# Patient Record
Sex: MALE | Race: WHITE | NOT HISPANIC OR LATINO | Employment: OTHER | URBAN - METROPOLITAN AREA
[De-identification: names, ages, dates, MRNs, and addresses within clinical notes are randomized per-mention and may not be internally consistent; named-entity substitution may affect disease eponyms.]

---

## 2017-03-07 ENCOUNTER — TRANSCRIBE ORDERS (OUTPATIENT)
Dept: CARDIOLOGY | Facility: HOSPITAL | Age: 62
End: 2017-03-07

## 2017-03-07 DIAGNOSIS — I20.0 UNSTABLE ANGINA (HCC): Primary | ICD-10-CM

## 2017-03-08 ENCOUNTER — HOSPITAL ENCOUNTER (OUTPATIENT)
Facility: HOSPITAL | Age: 62
Setting detail: HOSPITAL OUTPATIENT SURGERY
Discharge: HOME | End: 2017-03-08
Attending: INTERNAL MEDICINE | Admitting: INTERNAL MEDICINE

## 2017-03-08 VITALS
SYSTOLIC BLOOD PRESSURE: 119 MMHG | HEIGHT: 71 IN | RESPIRATION RATE: 18 BRPM | WEIGHT: 189.6 LBS | BODY MASS INDEX: 26.54 KG/M2 | TEMPERATURE: 98.3 F | DIASTOLIC BLOOD PRESSURE: 83 MMHG | OXYGEN SATURATION: 96 % | HEART RATE: 70 BPM

## 2017-03-08 DIAGNOSIS — I20.0 UNSTABLE ANGINA (HCC): ICD-10-CM

## 2017-03-08 LAB
ANION GAP SERPL CALCULATED.3IONS-SCNC: 2 MMOL/L (ref 3–11)
BUN BLD-MCNC: 15 MG/DL (ref 9–23)
BUN/CREAT SERPL: 16.7 (ref 7–25)
CALCIUM SPEC-SCNC: 9.9 MG/DL (ref 8.7–10.4)
CHLORIDE SERPL-SCNC: 105 MMOL/L (ref 99–109)
CO2 SERPL-SCNC: 33 MMOL/L (ref 20–31)
CREAT BLD-MCNC: 0.9 MG/DL (ref 0.6–1.3)
DEPRECATED RDW RBC AUTO: 42 FL (ref 37–54)
ERYTHROCYTE [DISTWIDTH] IN BLOOD BY AUTOMATED COUNT: 12.4 % (ref 11.3–14.5)
GFR SERPL CREATININE-BSD FRML MDRD: 86 ML/MIN/1.73
GLUCOSE BLD-MCNC: 82 MG/DL (ref 70–100)
HCT VFR BLD AUTO: 43.9 % (ref 38.9–50.9)
HGB BLD-MCNC: 14.9 G/DL (ref 13.1–17.5)
MCH RBC QN AUTO: 31.6 PG (ref 27–31)
MCHC RBC AUTO-ENTMCNC: 33.9 G/DL (ref 32–36)
MCV RBC AUTO: 93 FL (ref 80–99)
PLATELET # BLD AUTO: 192 10*3/MM3 (ref 150–450)
PMV BLD AUTO: 10.4 FL (ref 6–12)
POTASSIUM BLD-SCNC: 4.1 MMOL/L (ref 3.5–5.5)
RBC # BLD AUTO: 4.72 10*6/MM3 (ref 4.2–5.76)
SODIUM BLD-SCNC: 140 MMOL/L (ref 132–146)
WBC NRBC COR # BLD: 6.88 10*3/MM3 (ref 3.5–10.8)

## 2017-03-08 PROCEDURE — 93458 L HRT ARTERY/VENTRICLE ANGIO: CPT | Performed by: INTERNAL MEDICINE

## 2017-03-08 PROCEDURE — 93005 ELECTROCARDIOGRAM TRACING: CPT | Performed by: INTERNAL MEDICINE

## 2017-03-08 PROCEDURE — C1769 GUIDE WIRE: HCPCS | Performed by: INTERNAL MEDICINE

## 2017-03-08 PROCEDURE — 25010000002 BIVALIRUDIN: Performed by: INTERNAL MEDICINE

## 2017-03-08 PROCEDURE — 25010000002 MIDAZOLAM PER 1 MG: Performed by: INTERNAL MEDICINE

## 2017-03-08 PROCEDURE — 25010000002 FENTANYL CITRATE (PF) 100 MCG/2ML SOLUTION: Performed by: INTERNAL MEDICINE

## 2017-03-08 PROCEDURE — C1753 CATH, INTRAVAS ULTRASOUND: HCPCS | Performed by: INTERNAL MEDICINE

## 2017-03-08 PROCEDURE — 0 IOPAMIDOL PER 1 ML: Performed by: INTERNAL MEDICINE

## 2017-03-08 PROCEDURE — C1874 STENT, COATED/COV W/DEL SYS: HCPCS | Performed by: INTERNAL MEDICINE

## 2017-03-08 PROCEDURE — 25010000002 BIVALIRUDIN PER 1 MG: Performed by: INTERNAL MEDICINE

## 2017-03-08 PROCEDURE — 92978 ENDOLUMINL IVUS OCT C 1ST: CPT | Performed by: INTERNAL MEDICINE

## 2017-03-08 PROCEDURE — C1887 CATHETER, GUIDING: HCPCS | Performed by: INTERNAL MEDICINE

## 2017-03-08 PROCEDURE — C9600 PERC DRUG-EL COR STENT SING: HCPCS | Performed by: INTERNAL MEDICINE

## 2017-03-08 PROCEDURE — 85027 COMPLETE CBC AUTOMATED: CPT | Performed by: INTERNAL MEDICINE

## 2017-03-08 PROCEDURE — 80048 BASIC METABOLIC PNL TOTAL CA: CPT | Performed by: INTERNAL MEDICINE

## 2017-03-08 PROCEDURE — C1894 INTRO/SHEATH, NON-LASER: HCPCS | Performed by: INTERNAL MEDICINE

## 2017-03-08 PROCEDURE — 25010000002 HEPARIN (PORCINE) PER 1000 UNITS: Performed by: INTERNAL MEDICINE

## 2017-03-08 PROCEDURE — 36415 COLL VENOUS BLD VENIPUNCTURE: CPT

## 2017-03-08 DEVICE — IMPLANTABLE DEVICE: Type: IMPLANTABLE DEVICE | Status: FUNCTIONAL

## 2017-03-08 RX ORDER — HYDROCODONE BITARTRATE AND ACETAMINOPHEN 5; 325 MG/1; MG/1
1 TABLET ORAL EVERY 4 HOURS PRN
Status: DISCONTINUED | OUTPATIENT
Start: 2017-03-08 | End: 2017-03-08 | Stop reason: HOSPADM

## 2017-03-08 RX ORDER — CLOPIDOGREL BISULFATE 75 MG/1
TABLET ORAL AS NEEDED
Status: DISCONTINUED | OUTPATIENT
Start: 2017-03-08 | End: 2017-03-08 | Stop reason: HOSPADM

## 2017-03-08 RX ORDER — LIDOCAINE HYDROCHLORIDE 10 MG/ML
INJECTION, SOLUTION INFILTRATION; PERINEURAL AS NEEDED
Status: DISCONTINUED | OUTPATIENT
Start: 2017-03-08 | End: 2017-03-08 | Stop reason: HOSPADM

## 2017-03-08 RX ORDER — SODIUM CHLORIDE 9 MG/ML
250 INJECTION, SOLUTION INTRAVENOUS CONTINUOUS
Status: ACTIVE | OUTPATIENT
Start: 2017-03-08 | End: 2017-03-08

## 2017-03-08 RX ORDER — ASPIRIN 81 MG/1
81 TABLET ORAL DAILY
COMMUNITY
End: 2021-04-13 | Stop reason: ALTCHOICE

## 2017-03-08 RX ORDER — ACETAMINOPHEN 325 MG/1
650 TABLET ORAL EVERY 4 HOURS PRN
Status: DISCONTINUED | OUTPATIENT
Start: 2017-03-08 | End: 2017-03-08 | Stop reason: HOSPADM

## 2017-03-08 RX ORDER — MIDAZOLAM HYDROCHLORIDE 1 MG/ML
INJECTION INTRAMUSCULAR; INTRAVENOUS AS NEEDED
Status: DISCONTINUED | OUTPATIENT
Start: 2017-03-08 | End: 2017-03-08 | Stop reason: HOSPADM

## 2017-03-08 RX ORDER — LOSARTAN POTASSIUM 50 MG/1
50 TABLET ORAL DAILY
Status: ON HOLD | COMMUNITY
End: 2022-03-29

## 2017-03-08 RX ORDER — FENTANYL CITRATE 50 UG/ML
INJECTION, SOLUTION INTRAMUSCULAR; INTRAVENOUS AS NEEDED
Status: DISCONTINUED | OUTPATIENT
Start: 2017-03-08 | End: 2017-03-08 | Stop reason: HOSPADM

## 2017-03-08 RX ORDER — ASPIRIN 325 MG
325 TABLET ORAL ONCE
Status: COMPLETED | OUTPATIENT
Start: 2017-03-08 | End: 2017-03-08

## 2017-03-08 RX ORDER — TEMAZEPAM 7.5 MG/1
7.5 CAPSULE ORAL NIGHTLY PRN
Status: DISCONTINUED | OUTPATIENT
Start: 2017-03-08 | End: 2017-03-08 | Stop reason: HOSPADM

## 2017-03-08 RX ORDER — NALOXONE HCL 0.4 MG/ML
0.4 VIAL (ML) INJECTION
Status: DISCONTINUED | OUTPATIENT
Start: 2017-03-08 | End: 2017-03-08 | Stop reason: HOSPADM

## 2017-03-08 RX ORDER — ALPRAZOLAM 0.25 MG/1
0.25 TABLET ORAL 3 TIMES DAILY PRN
Status: DISCONTINUED | OUTPATIENT
Start: 2017-03-08 | End: 2017-03-08 | Stop reason: HOSPADM

## 2017-03-08 RX ORDER — MORPHINE SULFATE 2 MG/ML
1 INJECTION, SOLUTION INTRAMUSCULAR; INTRAVENOUS EVERY 4 HOURS PRN
Status: DISCONTINUED | OUTPATIENT
Start: 2017-03-08 | End: 2017-03-08 | Stop reason: HOSPADM

## 2017-03-08 RX ORDER — OMEPRAZOLE 40 MG/1
40 CAPSULE, DELAYED RELEASE ORAL DAILY
Status: ON HOLD | COMMUNITY
End: 2022-03-29

## 2017-03-08 RX ADMIN — ASPIRIN 325 MG ORAL TABLET 325 MG: 325 PILL ORAL at 11:14

## 2017-03-09 ENCOUNTER — DOCUMENTATION (OUTPATIENT)
Dept: CARDIAC REHAB | Facility: HOSPITAL | Age: 62
End: 2017-03-09

## 2017-03-09 DIAGNOSIS — Z95.5 STENTED CORONARY ARTERY: Primary | ICD-10-CM

## 2017-03-09 NOTE — PROGRESS NOTES
Pt. Referred for Phase II Cardiac Rehab. Staff left detailed message with Patient to return phone call. Staff will then discuss the benefits of exercise, program protocol, and provide education.Staff will then refer Patient to an outlying facility if he wishes to participate in the program. Thank you for this referral.

## 2021-04-13 ENCOUNTER — APPOINTMENT (OUTPATIENT)
Dept: GENERAL RADIOLOGY | Facility: HOSPITAL | Age: 66
End: 2021-04-13

## 2021-04-13 ENCOUNTER — HOSPITAL ENCOUNTER (EMERGENCY)
Facility: HOSPITAL | Age: 66
Discharge: HOME | End: 2021-04-13
Attending: EMERGENCY MEDICINE | Admitting: EMERGENCY MEDICINE

## 2021-04-13 VITALS
TEMPERATURE: 98.5 F | HEIGHT: 71 IN | SYSTOLIC BLOOD PRESSURE: 137 MMHG | BODY MASS INDEX: 28.56 KG/M2 | HEART RATE: 99 BPM | OXYGEN SATURATION: 95 % | DIASTOLIC BLOOD PRESSURE: 78 MMHG | RESPIRATION RATE: 18 BRPM | WEIGHT: 204 LBS

## 2021-04-13 DIAGNOSIS — N28.9 RENAL INSUFFICIENCY: ICD-10-CM

## 2021-04-13 DIAGNOSIS — I48.92 ATRIAL FLUTTER WITH RAPID VENTRICULAR RESPONSE (HCC): Primary | ICD-10-CM

## 2021-04-13 LAB
ALBUMIN SERPL-MCNC: 4.7 G/DL (ref 3.5–5.2)
ALBUMIN/GLOB SERPL: 1.7 G/DL
ALP SERPL-CCNC: 112 U/L (ref 39–117)
ALT SERPL W P-5'-P-CCNC: 32 U/L (ref 1–41)
ANION GAP SERPL CALCULATED.3IONS-SCNC: 11 MMOL/L (ref 5–15)
AST SERPL-CCNC: 34 U/L (ref 1–40)
BASOPHILS # BLD AUTO: 0.07 10*3/MM3 (ref 0–0.2)
BASOPHILS NFR BLD AUTO: 0.8 % (ref 0–1.5)
BILIRUB SERPL-MCNC: 0.7 MG/DL (ref 0–1.2)
BUN SERPL-MCNC: 22 MG/DL (ref 8–23)
BUN/CREAT SERPL: 16.7 (ref 7–25)
CALCIUM SPEC-SCNC: 10.4 MG/DL (ref 8.6–10.5)
CHLORIDE SERPL-SCNC: 106 MMOL/L (ref 98–107)
CO2 SERPL-SCNC: 26 MMOL/L (ref 22–29)
CREAT SERPL-MCNC: 1.32 MG/DL (ref 0.76–1.27)
DEPRECATED RDW RBC AUTO: 44.1 FL (ref 37–54)
EOSINOPHIL # BLD AUTO: 0.09 10*3/MM3 (ref 0–0.4)
EOSINOPHIL NFR BLD AUTO: 1 % (ref 0.3–6.2)
ERYTHROCYTE [DISTWIDTH] IN BLOOD BY AUTOMATED COUNT: 12.8 % (ref 12.3–15.4)
GFR SERPL CREATININE-BSD FRML MDRD: 55 ML/MIN/1.73
GLOBULIN UR ELPH-MCNC: 2.8 GM/DL
GLUCOSE SERPL-MCNC: 102 MG/DL (ref 65–99)
HCT VFR BLD AUTO: 48.3 % (ref 37.5–51)
HGB BLD-MCNC: 16.3 G/DL (ref 13–17.7)
HOLD SPECIMEN: NORMAL
IMM GRANULOCYTES # BLD AUTO: 0.03 10*3/MM3 (ref 0–0.05)
IMM GRANULOCYTES NFR BLD AUTO: 0.3 % (ref 0–0.5)
LIPASE SERPL-CCNC: 29 U/L (ref 13–60)
LYMPHOCYTES # BLD AUTO: 2.38 10*3/MM3 (ref 0.7–3.1)
LYMPHOCYTES NFR BLD AUTO: 25.9 % (ref 19.6–45.3)
MCH RBC QN AUTO: 32.1 PG (ref 26.6–33)
MCHC RBC AUTO-ENTMCNC: 33.7 G/DL (ref 31.5–35.7)
MCV RBC AUTO: 95.3 FL (ref 79–97)
MONOCYTES # BLD AUTO: 1.02 10*3/MM3 (ref 0.1–0.9)
MONOCYTES NFR BLD AUTO: 11.1 % (ref 5–12)
NEUTROPHILS NFR BLD AUTO: 5.59 10*3/MM3 (ref 1.7–7)
NEUTROPHILS NFR BLD AUTO: 60.9 % (ref 42.7–76)
NRBC BLD AUTO-RTO: 0 /100 WBC (ref 0–0.2)
NT-PROBNP SERPL-MCNC: 46.6 PG/ML (ref 0–900)
PLATELET # BLD AUTO: 241 10*3/MM3 (ref 140–450)
PMV BLD AUTO: 10.3 FL (ref 6–12)
POTASSIUM SERPL-SCNC: 4.2 MMOL/L (ref 3.5–5.2)
PROT SERPL-MCNC: 7.5 G/DL (ref 6–8.5)
RBC # BLD AUTO: 5.07 10*6/MM3 (ref 4.14–5.8)
SODIUM SERPL-SCNC: 143 MMOL/L (ref 136–145)
TROPONIN T SERPL-MCNC: <0.01 NG/ML (ref 0–0.03)
WBC # BLD AUTO: 9.18 10*3/MM3 (ref 3.4–10.8)
WHOLE BLOOD HOLD SPECIMEN: NORMAL
WHOLE BLOOD HOLD SPECIMEN: NORMAL

## 2021-04-13 PROCEDURE — 99152 MOD SED SAME PHYS/QHP 5/>YRS: CPT

## 2021-04-13 PROCEDURE — 71045 X-RAY EXAM CHEST 1 VIEW: CPT

## 2021-04-13 PROCEDURE — 83880 ASSAY OF NATRIURETIC PEPTIDE: CPT | Performed by: EMERGENCY MEDICINE

## 2021-04-13 PROCEDURE — 83690 ASSAY OF LIPASE: CPT | Performed by: EMERGENCY MEDICINE

## 2021-04-13 PROCEDURE — 99284 EMERGENCY DEPT VISIT MOD MDM: CPT

## 2021-04-13 PROCEDURE — 85025 COMPLETE CBC W/AUTO DIFF WBC: CPT | Performed by: EMERGENCY MEDICINE

## 2021-04-13 PROCEDURE — 25010000002 PROPOFOL 10 MG/ML EMULSION: Performed by: EMERGENCY MEDICINE

## 2021-04-13 PROCEDURE — 25010000002 ADENOSINE PER 6 MG: Performed by: EMERGENCY MEDICINE

## 2021-04-13 PROCEDURE — 80053 COMPREHEN METABOLIC PANEL: CPT | Performed by: EMERGENCY MEDICINE

## 2021-04-13 PROCEDURE — 96374 THER/PROPH/DIAG INJ IV PUSH: CPT

## 2021-04-13 PROCEDURE — 93005 ELECTROCARDIOGRAM TRACING: CPT | Performed by: EMERGENCY MEDICINE

## 2021-04-13 PROCEDURE — 84484 ASSAY OF TROPONIN QUANT: CPT | Performed by: EMERGENCY MEDICINE

## 2021-04-13 PROCEDURE — 92960 CARDIOVERSION ELECTRIC EXT: CPT

## 2021-04-13 RX ORDER — PROPOFOL 10 MG/ML
100 VIAL (ML) INTRAVENOUS ONCE
Status: COMPLETED | OUTPATIENT
Start: 2021-04-13 | End: 2021-04-13

## 2021-04-13 RX ORDER — ASPIRIN 81 MG/1
324 TABLET, CHEWABLE ORAL ONCE
Status: DISCONTINUED | OUTPATIENT
Start: 2021-04-13 | End: 2021-04-13 | Stop reason: HOSPADM

## 2021-04-13 RX ORDER — ADENOSINE 3 MG/ML
12 INJECTION, SOLUTION INTRAVENOUS ONCE
Status: COMPLETED | OUTPATIENT
Start: 2021-04-13 | End: 2021-04-13

## 2021-04-13 RX ORDER — SODIUM CHLORIDE 0.9 % (FLUSH) 0.9 %
10 SYRINGE (ML) INJECTION AS NEEDED
Status: DISCONTINUED | OUTPATIENT
Start: 2021-04-13 | End: 2021-04-13 | Stop reason: HOSPADM

## 2021-04-13 RX ADMIN — ADENOSINE 12 MG: 3 INJECTION INTRAVENOUS at 00:37

## 2021-04-13 RX ADMIN — RIVAROXABAN 20 MG: 20 TABLET, FILM COATED ORAL at 02:28

## 2021-04-13 RX ADMIN — SODIUM CHLORIDE 1000 ML: 9 INJECTION, SOLUTION INTRAVENOUS at 02:15

## 2021-04-13 RX ADMIN — PROPOFOL 100 MG: 10 INJECTION, EMULSION INTRAVENOUS at 01:43

## 2021-04-13 NOTE — ED NOTES
Timeout:  Physician x 1 RN x 1  0140: 60mg propofol IV given by provider  0143: 20mg propofol IV given by provider  0144: Synchronized cardioversion performed at 200j  0145: 20mg propofol IV given by provider  HR 98     Iqra Hansen, RN  04/13/21 0145

## 2021-04-13 NOTE — ED PROVIDER NOTES
"Subjective   65-year-old male presents for evaluation of heart palpitations.  Of note, the patient has a history of coronary artery disease.  He is followed by Dr. Cesar.  He states that he had been doing well until tonight just after 9 PM.  He just finished doing some yard work when he began experiencing a rapid heart rate and palpitations as well as some mild chest pressure.  He checked his vital signs and noted that his heart rate was in the 160s.  This was quite abnormal for him so he was brought to the emergency department by his wife who is a retired nurse.  He denies any active chest pain at this time but states that he can feel his heart \"racing.\"  He is not a heavy alcohol drinker.  He denies any recent changes to his diet or caffeine intake.  He did start taking any vitamin recently.  He is unsure what could have potentially triggered his symptoms tonight.          Review of Systems   Respiratory: Positive for chest tightness.    Cardiovascular: Positive for palpitations.   All other systems reviewed and are negative.      Past Medical History:   Diagnosis Date   • Chest pain    • JOHNSON (dyspnea on exertion)    • GERD (gastroesophageal reflux disease)    • Hypertension    • Kidney stones    • SOB (shortness of breath)    • TB (tuberculosis), treated        Allergies   Allergen Reactions   • Sulfa Antibiotics Rash       Past Surgical History:   Procedure Laterality Date   • CARDIAC CATHETERIZATION N/A 3/8/2017    Procedure: Left Heart Cath;  Surgeon: Aidan Cesar MD;  Location: [facility] INVASIVE LOCATION;  Service:    • CATARACT EXTRACTION     • RETINAL DETACHMENT REPAIR Left    • TONSILLECTOMY         No family history on file.    Social History     Socioeconomic History   • Marital status:      Spouse name: Not on file   • Number of children: Not on file   • Years of education: Not on file   • Highest education level: Not on file   Tobacco Use   • Smoking status: Former Smoker "   Substance and Sexual Activity   • Alcohol use: No   • Drug use: No   • Sexual activity: Defer           Objective   Physical Exam  Vitals and nursing note reviewed.   Constitutional:       General: He is not in acute distress.     Appearance: He is well-developed. He is not diaphoretic.      Comments: Nontoxic-appearing male   HENT:      Head: Normocephalic and atraumatic.   Neck:      Vascular: No JVD.   Cardiovascular:      Rate and Rhythm: Regular rhythm.      Heart sounds: Normal heart sounds. No murmur heard.   No friction rub. No gallop.       Comments: Tachycardic  Pulmonary:      Effort: Pulmonary effort is normal. No respiratory distress.      Breath sounds: Normal breath sounds. No wheezing or rales.   Abdominal:      General: Bowel sounds are normal. There is no distension.      Palpations: Abdomen is soft. There is no mass.      Tenderness: There is no abdominal tenderness. There is no guarding.   Musculoskeletal:         General: Normal range of motion.      Cervical back: Normal range of motion.      Right lower leg: No edema.      Left lower leg: No edema.   Skin:     General: Skin is warm and dry.      Coloration: Skin is not pale.      Findings: No erythema or rash.   Neurological:      General: No focal deficit present.      Mental Status: He is alert and oriented to person, place, and time.   Psychiatric:         Mood and Affect: Mood normal.         Thought Content: Thought content normal.         Judgment: Judgment normal.         Chemical Cardioversion    Date/Time: 4/13/2021 3:44 AM  Performed by: Mynor Alvarado MD  Authorized by: Mynor Alvarado MD     Consent:     Consent obtained:  Verbal and written    Consent given by:  Patient    Risks discussed:  Death and induced arrhythmia  Pre-procedure details:     Pre-procedure rhythm: Narrow complex regular tachycardia--SVT versus atrial flutter with rapid ventricular response.  Attempt one:     Cardioversion medication:  Adenosine  12mg      Medication outcome:  No change in rhythm  Post-procedure details:     Patient status:  Awake    Patient tolerance of procedure:  Tolerated well, no immediate complications  Critical Care  Performed by: Mynor Alvarado MD  Authorized by: Mynor Alvarado MD     Critical care provider statement:     Critical care time (minutes):  40    Critical care was necessary to treat or prevent imminent or life-threatening deterioration of the following conditions:  Cardiac failure    Critical care was time spent personally by me on the following activities:  Development of treatment plan with patient or surrogate, evaluation of patient's response to treatment, examination of patient, obtaining history from patient or surrogate, ordering and performing treatments and interventions, ordering and review of laboratory studies, ordering and review of radiographic studies, pulse oximetry, re-evaluation of patient's condition and review of old charts  Electrical Cardioversion    Date/Time: 4/13/2021 3:45 AM  Performed by: Mynor Alvarado MD  Authorized by: Mynor Alvarado MD     Consent:     Consent obtained:  Verbal and written    Consent given by:  Patient    Risks discussed:  Cutaneous burn, death, induced arrhythmia and pain    Alternatives discussed:  Rate-control medication  Pre-procedure details:     Rhythm:  Atrial flutter    Electrode placement:  Anterior-posterior  Attempt one:     Cardioversion mode:  Synchronous    Waveform:  Biphasic    Shock (Joules):  200    Shock outcome:  Conversion to normal sinus rhythm  Post-procedure details:     Patient status:  Awake    Patient tolerance of procedure:  Tolerated well, no immediate complications  Procedural Sedation    Date/Time: 4/13/2021 3:46 AM  Performed by: Mynor Alvarado MD  Authorized by: Mynor Alvarado MD     Consent:     Consent obtained:  Verbal and written    Consent given by:  Patient    Risks discussed:  Allergic reaction,  inadequate sedation, dysrhythmia, nausea, prolonged hypoxia resulting in organ damage, respiratory compromise necessitating ventilatory assistance and intubation and vomiting  Indications:     Procedure performed:  Cardioversion    Procedure necessitating sedation performed by:  Physician performing sedation    Intended level of sedation:  Moderate (conscious sedation)  Pre-sedation assessment:     Time since last food or drink:  6 PM    ASA classification: class 2 - patient with mild systemic disease      Mallampati score:  II - soft palate, uvula, fauces visible    Pre-sedation assessments completed and reviewed: airway patency, anesthesia/sedation history, cardiovascular function, hydration status, mental status, nausea/vomiting, pain level, respiratory function and temperature      Pre-sedation assessment completed:  4/13/2021 1:30 AM  Immediate pre-procedure details:     Reassessment: Patient reassessed immediately prior to procedure      Reviewed: vital signs, relevant labs/tests and NPO status      Verified: bag valve mask available, emergency equipment available, intubation equipment available, IV patency confirmed and oxygen available    Procedure details (see MAR for exact dosages):     Sedation start time:  4/13/2021 1:40 AM    Preoxygenation:  Nasal cannula    Sedation:  Propofol    Intra-procedure monitoring:  Blood pressure monitoring, cardiac monitor, continuous pulse oximetry, continuous capnometry and frequent LOC assessments    Intra-procedure events: none      Sedation end time:  4/13/2021 1:52 AM    Total sedation time (minutes):  12  Post-procedure details:     Post-sedation assessment completed:  4/13/2021 2:09 AM    Attendance: Constant attendance by certified staff until patient recovered      Recovery: Patient returned to pre-procedure baseline      Estimated blood loss (see I/O flowsheets): no      Complications:  N/A    Post-sedation assessments completed and reviewed: airway patency,  cardiovascular function, hydration status, mental status, nausea/vomiting, pain level and respiratory function      Specimens recovered:  None    Patient is stable for discharge or admission: yes      Patient tolerance:  Tolerated well, no immediate complications               ED Course  ED Course as of Apr 13 0349 Tue Apr 13, 2021 0347 65-year-old male with a history of coronary artery disease presents for evaluation of palpitations this evening.  His symptoms started just after 9 PM.  On arrival to the ED, the patient is nontoxic-appearing.  However, he is quite tachycardic with heart rates ranging from 160-170.  His initial EKG revealed a regular narrow complex tachycardia concerning for SVT versus atrial flutter with rapid ventricular response.  I attempted vagal maneuvers with no change in the patient's heart rate.  Adenosine 12 mg IV was given which unmasked underlying atrial flutter waves.  The patient is a very good historian with a clear time of onset of symptoms.  He is an ideal candidate for electrical cardioversion.  Labs remarkable only for mild renal insufficiency.  IV fluids given.  After obtaining informed consent, using propofol for procedural sedation, the patient was electrically cardioverted at 200 J on the first attempt successfully.  Repeat EKG revealed normal sinus rhythm.  The patient was then observed in the emergency department for nearly an hour and remained in normal sinus rhythm.  I feel that he is appropriate for discharge and prompt outpatient cardiology follow-up at this point.  [id number] score of 2.  Prescription for Xarelto.  First dose given here in the emergency department.  The patient will contact Dr. Cesar first thing tomorrow morning.  Additionally, he was given a referral to our heart valve arrhythmia clinic and will follow-up within the next 72 hours.  Agreeable with plan and given appropriate strict return precautions.  Patient feels well at the time of discharge.     [DD]      ED Course User Index  [DD] Mynor Alvarado MD                                   Recent Results (from the past 24 hour(s))   Troponin    Collection Time: 04/13/21 12:22 AM    Specimen: Blood   Result Value Ref Range    Troponin T <0.010 0.000 - 0.030 ng/mL   Comprehensive Metabolic Panel    Collection Time: 04/13/21 12:22 AM    Specimen: Blood   Result Value Ref Range    Glucose 102 (H) 65 - 99 mg/dL    BUN 22 8 - 23 mg/dL    Creatinine 1.32 (H) 0.76 - 1.27 mg/dL    Sodium 143 136 - 145 mmol/L    Potassium 4.2 3.5 - 5.2 mmol/L    Chloride 106 98 - 107 mmol/L    CO2 26.0 22.0 - 29.0 mmol/L    Calcium 10.4 8.6 - 10.5 mg/dL    Total Protein 7.5 6.0 - 8.5 g/dL    Albumin 4.70 3.50 - 5.20 g/dL    ALT (SGPT) 32 1 - 41 U/L    AST (SGOT) 34 1 - 40 U/L    Alkaline Phosphatase 112 39 - 117 U/L    Total Bilirubin 0.7 0.0 - 1.2 mg/dL    eGFR Non African Amer 55 (L) >60 mL/min/1.73    Globulin 2.8 gm/dL    A/G Ratio 1.7 g/dL    BUN/Creatinine Ratio 16.7 7.0 - 25.0    Anion Gap 11.0 5.0 - 15.0 mmol/L   Lipase    Collection Time: 04/13/21 12:22 AM    Specimen: Blood   Result Value Ref Range    Lipase 29 13 - 60 U/L   BNP    Collection Time: 04/13/21 12:22 AM    Specimen: Blood   Result Value Ref Range    proBNP 46.6 0.0 - 900.0 pg/mL   Light Blue Top    Collection Time: 04/13/21 12:22 AM   Result Value Ref Range    Extra Tube hold for add-on    Green Top (Gel)    Collection Time: 04/13/21 12:22 AM   Result Value Ref Range    Extra Tube Hold for add-ons.    Lavender Top    Collection Time: 04/13/21 12:22 AM   Result Value Ref Range    Extra Tube hold for add-on    Gold Top - SST    Collection Time: 04/13/21 12:22 AM   Result Value Ref Range    Extra Tube Hold for add-ons.    Gray Top - Ice    Collection Time: 04/13/21 12:22 AM   Result Value Ref Range    Extra Tube Hold for add-ons.    CBC Auto Differential    Collection Time: 04/13/21 12:22 AM    Specimen: Blood   Result Value Ref Range    WBC 9.18 3.40 - 10.80  10*3/mm3    RBC 5.07 4.14 - 5.80 10*6/mm3    Hemoglobin 16.3 13.0 - 17.7 g/dL    Hematocrit 48.3 37.5 - 51.0 %    MCV 95.3 79.0 - 97.0 fL    MCH 32.1 26.6 - 33.0 pg    MCHC 33.7 31.5 - 35.7 g/dL    RDW 12.8 12.3 - 15.4 %    RDW-SD 44.1 37.0 - 54.0 fl    MPV 10.3 6.0 - 12.0 fL    Platelets 241 140 - 450 10*3/mm3    Neutrophil % 60.9 42.7 - 76.0 %    Lymphocyte % 25.9 19.6 - 45.3 %    Monocyte % 11.1 5.0 - 12.0 %    Eosinophil % 1.0 0.3 - 6.2 %    Basophil % 0.8 0.0 - 1.5 %    Immature Grans % 0.3 0.0 - 0.5 %    Neutrophils, Absolute 5.59 1.70 - 7.00 10*3/mm3    Lymphocytes, Absolute 2.38 0.70 - 3.10 10*3/mm3    Monocytes, Absolute 1.02 (H) 0.10 - 0.90 10*3/mm3    Eosinophils, Absolute 0.09 0.00 - 0.40 10*3/mm3    Basophils, Absolute 0.07 0.00 - 0.20 10*3/mm3    Immature Grans, Absolute 0.03 0.00 - 0.05 10*3/mm3    nRBC 0.0 0.0 - 0.2 /100 WBC     Note: In addition to lab results from this visit, the labs listed above may include labs taken at another facility or during a different encounter within the last 24 hours. Please correlate lab times with ED admission and discharge times for further clarification of the services performed during this visit.    XR Chest 1 View   Final Result   No active disease.      Signer Name: Gideon Joseph MD    Signed: 4/13/2021 12:44 AM    Workstation Name: [email or web address]     [facility]        Vitals:    04/13/21 0145 04/13/21 0200 04/13/21 0230 04/13/21 0232   BP: (!) 163/108 124/84 137/78    BP Location:       Patient Position:       Pulse: 95 96  99   Resp:       Temp:       SpO2: 98% 98% 95% 95%   Weight:       Height:         Medications   sodium chloride 0.9 % flush 10 mL (has no administration in time range)   aspirin chewable tablet 324 mg (0 mg Oral Hold 4/13/21 0023)   adenosine (ADENOCARD) injection 12 mg (12 mg Intravenous Given 4/13/21 0037)   Propofol (DIPRIVAN) injection 100 mg (100 mg Intravenous Given 4/13/21 0143)   sodium chloride 0.9 %  bolus 1,000 mL (0 mL Intravenous Stopped 4/13/21 0248)   rivaroxaban (XARELTO) tablet 20 mg (20 mg Oral Given 4/13/21 0228)     ECG/EMG Results (last 24 hours)     Procedure Component Value Units Date/Time    ECG 12 Lead [[id number]] Collected: 04/13/21 0010     Updated: 04/13/21 0012    ECG 12 Lead [[id number]] Collected: 04/13/21 0149     Updated: 04/13/21 0149        ECG 12 Lead         ECG 12 Lead                     MDM    Final diagnoses:   Atrial flutter with rapid ventricular response (CMS/HCC)   Renal insufficiency       ED Disposition  ED Disposition     ED Disposition Condition Comment    Discharge Stable           Jose G Silva, DO  [address]  [facility] [id number]  [phone]    In 1 week      [facility] CARDIOLOGY  [address]  [address]  [facility] [address] [phone]  In 3 days      [facility] CARDIOLOG[facility]  [address]  [address]  [facility] [address] [phone]             Medication List      New Prescriptions    rivaroxaban 20 MG tablet  Commonly known as: XARELTO  Take 1 tablet by mouth Daily.           Where to Get Your Medications      These medications were sent to [facility] [address] [place] [address] - [address] - [phone]  - [phone] FX  [address] [facility] [address]    Phone: [phone]   · rivaroxaban 20 MG tablet          Mynor Alvarado MD  04/13/21 9805

## 2021-04-14 LAB
QT INTERVAL: 270 MS
QT INTERVAL: 334 MS
QTC INTERVAL: 421 MS
QTC INTERVAL: 447 MS

## 2022-03-14 ENCOUNTER — TRANSCRIBE ORDERS (OUTPATIENT)
Dept: ADMINISTRATIVE | Facility: HOSPITAL | Age: 67
End: 2022-03-14

## 2022-03-14 DIAGNOSIS — I20.0 UNSTABLE ANGINA: Primary | ICD-10-CM

## 2022-03-29 ENCOUNTER — HOSPITAL ENCOUNTER (OUTPATIENT)
Facility: HOSPITAL | Age: 67
Setting detail: HOSPITAL OUTPATIENT SURGERY
Discharge: HOME | End: 2022-03-29
Attending: INTERNAL MEDICINE | Admitting: INTERNAL MEDICINE

## 2022-03-29 VITALS
SYSTOLIC BLOOD PRESSURE: 122 MMHG | HEART RATE: 57 BPM | WEIGHT: 206.79 LBS | BODY MASS INDEX: 28.95 KG/M2 | OXYGEN SATURATION: 95 % | DIASTOLIC BLOOD PRESSURE: 75 MMHG | TEMPERATURE: 98.7 F | HEIGHT: 71 IN | RESPIRATION RATE: 16 BRPM

## 2022-03-29 DIAGNOSIS — I20.0 UNSTABLE ANGINA: ICD-10-CM

## 2022-03-29 LAB
ANION GAP SERPL CALCULATED.3IONS-SCNC: 12 MMOL/L (ref 5–15)
BUN SERPL-MCNC: 19 MG/DL (ref 8–23)
BUN/CREAT SERPL: 19 (ref 7–25)
CALCIUM SPEC-SCNC: 9.5 MG/DL (ref 8.6–10.5)
CHLORIDE SERPL-SCNC: 105 MMOL/L (ref 98–107)
CO2 SERPL-SCNC: 21 MMOL/L (ref 22–29)
CREAT BLDA-MCNC: 1 MG/DL (ref 0.6–1.3)
CREAT SERPL-MCNC: 1 MG/DL (ref 0.76–1.27)
DEPRECATED RDW RBC AUTO: 42.9 FL (ref 37–54)
EGFRCR SERPLBLD CKD-EPI 2021: 83.5 ML/MIN/1.73
ERYTHROCYTE [DISTWIDTH] IN BLOOD BY AUTOMATED COUNT: 12.4 % (ref 12.3–15.4)
GLUCOSE SERPL-MCNC: 114 MG/DL (ref 65–99)
HCT VFR BLD AUTO: 43.9 % (ref 37.5–51)
HGB BLD-MCNC: 15.1 G/DL (ref 13–17.7)
MCH RBC QN AUTO: 32.3 PG (ref 26.6–33)
MCHC RBC AUTO-ENTMCNC: 34.4 G/DL (ref 31.5–35.7)
MCV RBC AUTO: 94 FL (ref 79–97)
PLATELET # BLD AUTO: 183 10*3/MM3 (ref 140–450)
PMV BLD AUTO: 10.5 FL (ref 6–12)
POTASSIUM SERPL-SCNC: 4.5 MMOL/L (ref 3.5–5.2)
RBC # BLD AUTO: 4.67 10*6/MM3 (ref 4.14–5.8)
SODIUM SERPL-SCNC: 138 MMOL/L (ref 136–145)
WBC NRBC COR # BLD: 5.34 10*3/MM3 (ref 3.4–10.8)

## 2022-03-29 PROCEDURE — C1760 CLOSURE DEV, VASC: HCPCS | Performed by: INTERNAL MEDICINE

## 2022-03-29 PROCEDURE — C1769 GUIDE WIRE: HCPCS | Performed by: INTERNAL MEDICINE

## 2022-03-29 PROCEDURE — 93458 L HRT ARTERY/VENTRICLE ANGIO: CPT | Performed by: INTERNAL MEDICINE

## 2022-03-29 PROCEDURE — 25010000002 MIDAZOLAM PER 1 MG: Performed by: INTERNAL MEDICINE

## 2022-03-29 PROCEDURE — 85027 COMPLETE CBC AUTOMATED: CPT | Performed by: INTERNAL MEDICINE

## 2022-03-29 PROCEDURE — 0 IOPAMIDOL PER 1 ML: Performed by: INTERNAL MEDICINE

## 2022-03-29 PROCEDURE — C1894 INTRO/SHEATH, NON-LASER: HCPCS | Performed by: INTERNAL MEDICINE

## 2022-03-29 PROCEDURE — 82565 ASSAY OF CREATININE: CPT

## 2022-03-29 PROCEDURE — 80048 BASIC METABOLIC PNL TOTAL CA: CPT | Performed by: INTERNAL MEDICINE

## 2022-03-29 PROCEDURE — 25010000002 FENTANYL CITRATE (PF) 50 MCG/ML SOLUTION: Performed by: INTERNAL MEDICINE

## 2022-03-29 RX ORDER — ATORVASTATIN CALCIUM 40 MG/1
40 TABLET, FILM COATED ORAL NIGHTLY
COMMUNITY

## 2022-03-29 RX ORDER — SODIUM CHLORIDE 9 MG/ML
250 INJECTION, SOLUTION INTRAVENOUS CONTINUOUS
Status: ACTIVE | OUTPATIENT
Start: 2022-03-29 | End: 2022-03-29

## 2022-03-29 RX ORDER — HYDROCODONE BITARTRATE AND ACETAMINOPHEN 5; 325 MG/1; MG/1
1 TABLET ORAL EVERY 4 HOURS PRN
Status: DISCONTINUED | OUTPATIENT
Start: 2022-03-29 | End: 2022-03-29 | Stop reason: HOSPADM

## 2022-03-29 RX ORDER — TAMSULOSIN HYDROCHLORIDE 0.4 MG/1
1 CAPSULE ORAL DAILY
COMMUNITY
End: 2023-02-22

## 2022-03-29 RX ORDER — PANTOPRAZOLE SODIUM 40 MG/1
40 TABLET, DELAYED RELEASE ORAL DAILY
COMMUNITY

## 2022-03-29 RX ORDER — FENTANYL CITRATE 50 UG/ML
INJECTION, SOLUTION INTRAMUSCULAR; INTRAVENOUS AS NEEDED
Status: DISCONTINUED | OUTPATIENT
Start: 2022-03-29 | End: 2022-03-29 | Stop reason: HOSPADM

## 2022-03-29 RX ORDER — METOPROLOL SUCCINATE 50 MG/1
50 TABLET, EXTENDED RELEASE ORAL DAILY
Status: ON HOLD | COMMUNITY
End: 2022-08-09

## 2022-03-29 RX ORDER — MIDAZOLAM HYDROCHLORIDE 1 MG/ML
INJECTION INTRAMUSCULAR; INTRAVENOUS AS NEEDED
Status: DISCONTINUED | OUTPATIENT
Start: 2022-03-29 | End: 2022-03-29 | Stop reason: HOSPADM

## 2022-03-29 RX ORDER — ALPRAZOLAM 0.25 MG/1
0.25 TABLET ORAL 3 TIMES DAILY PRN
Status: DISCONTINUED | OUTPATIENT
Start: 2022-03-29 | End: 2022-03-29 | Stop reason: HOSPADM

## 2022-03-29 RX ORDER — LIDOCAINE HYDROCHLORIDE 10 MG/ML
INJECTION, SOLUTION EPIDURAL; INFILTRATION; INTRACAUDAL; PERINEURAL AS NEEDED
Status: DISCONTINUED | OUTPATIENT
Start: 2022-03-29 | End: 2022-03-29 | Stop reason: HOSPADM

## 2022-03-29 RX ORDER — FINASTERIDE 5 MG/1
5 TABLET, FILM COATED ORAL DAILY
COMMUNITY

## 2022-03-29 RX ORDER — ASCORBIC ACID 500 MG
500 TABLET ORAL DAILY
COMMUNITY
End: 2022-06-13

## 2022-03-29 RX ORDER — CHOLECALCIFEROL (VITAMIN D3) 125 MCG
500 CAPSULE ORAL DAILY
COMMUNITY

## 2022-03-29 RX ORDER — TEMAZEPAM 7.5 MG/1
7.5 CAPSULE ORAL NIGHTLY PRN
Status: DISCONTINUED | OUTPATIENT
Start: 2022-03-29 | End: 2022-03-29 | Stop reason: HOSPADM

## 2022-03-29 RX ORDER — ASPIRIN 81 MG/1
81 TABLET ORAL DAILY
COMMUNITY

## 2022-03-29 RX ORDER — MULTIVIT-MIN/IRON/FOLIC ACID/K 18-600-40
CAPSULE ORAL DAILY
COMMUNITY
End: 2022-06-13

## 2022-03-29 RX ORDER — MULTIPLE VITAMINS W/ MINERALS TAB 9MG-400MCG
1 TAB ORAL DAILY
COMMUNITY

## 2022-03-29 RX ORDER — TRAZODONE HYDROCHLORIDE 50 MG/1
25 TABLET ORAL NIGHTLY PRN
COMMUNITY
End: 2022-06-13

## 2022-03-29 RX ORDER — OXYCODONE AND ACETAMINOPHEN 10; 325 MG/1; MG/1
1 TABLET ORAL EVERY 4 HOURS PRN
Status: DISCONTINUED | OUTPATIENT
Start: 2022-03-29 | End: 2022-03-29 | Stop reason: HOSPADM

## 2022-03-29 RX ORDER — LOSARTAN POTASSIUM 100 MG/1
100 TABLET ORAL DAILY
COMMUNITY
End: 2023-02-10

## 2022-03-29 RX ORDER — ACETAMINOPHEN 325 MG/1
650 TABLET ORAL EVERY 4 HOURS PRN
Status: DISCONTINUED | OUTPATIENT
Start: 2022-03-29 | End: 2022-03-29 | Stop reason: HOSPADM

## 2022-03-29 RX ORDER — CHLORAL HYDRATE 500 MG
2000 CAPSULE ORAL
COMMUNITY

## 2022-06-13 ENCOUNTER — OFFICE VISIT (OUTPATIENT)
Dept: CARDIOLOGY | Facility: CLINIC | Age: 67
End: 2022-06-13

## 2022-06-13 VITALS
WEIGHT: 211 LBS | SYSTOLIC BLOOD PRESSURE: 124 MMHG | BODY MASS INDEX: 29.54 KG/M2 | HEIGHT: 71 IN | OXYGEN SATURATION: 98 % | HEART RATE: 58 BPM | DIASTOLIC BLOOD PRESSURE: 70 MMHG

## 2022-06-13 DIAGNOSIS — I25.10 CORONARY ARTERY DISEASE INVOLVING NATIVE CORONARY ARTERY OF NATIVE HEART WITHOUT ANGINA PECTORIS: ICD-10-CM

## 2022-06-13 DIAGNOSIS — I48.0 PAROXYSMAL ATRIAL FIBRILLATION: Primary | ICD-10-CM

## 2022-06-13 DIAGNOSIS — K92.2 GASTROINTESTINAL HEMORRHAGE, UNSPECIFIED GASTROINTESTINAL HEMORRHAGE TYPE: ICD-10-CM

## 2022-06-13 DIAGNOSIS — I10 PRIMARY HYPERTENSION: ICD-10-CM

## 2022-06-13 PROBLEM — I48.91 ATRIAL FIBRILLATION: Status: ACTIVE | Noted: 2021-04-01

## 2022-06-13 PROBLEM — I20.0 UNSTABLE ANGINA (HCC): Status: RESOLVED | Noted: 2022-03-14 | Resolved: 2022-06-13

## 2022-06-13 PROCEDURE — 93000 ELECTROCARDIOGRAM COMPLETE: CPT | Performed by: INTERNAL MEDICINE

## 2022-06-13 PROCEDURE — 99204 OFFICE O/P NEW MOD 45 MIN: CPT | Performed by: INTERNAL MEDICINE

## 2022-06-13 NOTE — PROGRESS NOTES
Cardiac Electrophysiology Outpatient Consult Note            [facility] at [facility]    Consult Note     Justus Gaitan  [id number]  06/13/2022  [phone]     Primary Care Physician: Kelby Kidd MD    Referred By: Roni Saab MD    Subjective     Chief Complaint:   Diagnoses and all orders for this visit:    1. Paroxysmal atrial fibrillation (HCC) (Primary)    2. Coronary artery disease involving native coronary artery of native heart without angina pectoris    3. Primary hypertension    4. Gastrointestinal hemorrhage, unspecified gastrointestinal hemorrhage type    Other orders  -     ECG 12 Lead      Chief Complaint   Patient presents with   • Establish Care     Discuss watchman       History of Present Illness:   Justus Gaitan is a 66 y.o. male who presents to my electrophysiology clinic for evaluation of PAF with history of hematuria and GI bleeding.  He had an episode of A. fib/flutter requiring ECV in April 2021.  He was started on Xarelto.  Since starting Xarelto he has had 2 bleeding events.  The first was hematuria in September 2021.  His he has chronic nephrolithiasis.  He had a CT scan at that time showing no acute renal stones.  His renal status remains under investigation for possible renal cell carcinoma.  In January of this year he had a GI bleed.  He has had both EGD and colonoscopy which revealed only diverticulosis.  He has had no known recurrence of atrial fibrillation.  He has no current complaint of orthopnea, PND, claudication, lower extremity edema.  His blood pressures at home are well managed.  He has never had a sleep study.  He has no history of CHF, no history of TIA or CVA.  He is not diabetic.      Past Medical History:   Patient Active Problem List    Diagnosis Date Noted   • Coronary artery disease      Note Last Updated: 6/13/2022     · [facility], 3/8/2017: RICHARD to RCA, EF 50%  · [facility], 3/29/2022: Normal EF, no critical obstructive CAD     •  Hyperlipidemia    • Hypertension    • GERD (gastroesophageal reflux disease)    • Kidney stones    • GIB (gastrointestinal bleeding)      Note Last Updated: 2022     · 2022, with negative EGD and Colonoscopy     • Hematuria      Note Last Updated: 2022     · 2021, ongoing evaluation     • Diverticulosis    • Atrial fibrillation (HCC) 2021     Note Last Updated: 2022     · Afib/ A flutter- required manual cardioversion 2021  · Echo, 3/10/2022: Normal LV, EF 65%.  Left atrium normal size.  Normal valvular morphology.         Past Surgical History:   Past Surgical History:   Procedure Laterality Date   • CARDIAC CATHETERIZATION N/A 2017    Procedure: Left Heart Cath;  Surgeon: Aidan Cesar MD;  Location:  [facility];  Service:    • CARDIAC CATHETERIZATION N/A 2022    Procedure: Left Heart Cath 93559;  Surgeon: Roni Saab MD;  Location:  [facility];  Service: Cardiology;  Laterality: N/A;   • CATARACT EXTRACTION     • CORONARY STENT PLACEMENT  2017   • RETINAL DETACHMENT REPAIR Left    • TONSILLECTOMY         Social History:   Social History     Socioeconomic History   • Marital status:    Tobacco Use   • Smoking status: Former Smoker     Packs/day: 0.25     Years: 3.00     Pack years: 0.75     Quit date:      Years since quittin.4   • Smokeless tobacco: Never Used   Vaping Use   • Vaping Use: Never used   Substance and Sexual Activity   • Alcohol use: Yes     Alcohol/week: 2.0 standard drinks     Types: 2 Cans of beer per week     Comment: Or less   • Drug use: No   • Sexual activity: Yes     Partners: Female     Birth control/protection: None     Comment: Old age       Medications:     Current Outpatient Medications:   •  APPLE CIDER VINEGAR PO, Take  by mouth Daily., Disp: , Rfl:   •  aspirin 81 MG EC tablet, Take 81 mg by mouth Daily., Disp: , Rfl:   •  atorvastatin (LIPITOR) 40 MG tablet, Take  "40 mg by mouth Every Night., Disp: , Rfl:   •  finasteride (PROSCAR) 5 MG tablet, Take 5 mg by mouth Daily., Disp: , Rfl:   •  losartan (COZAAR) 100 MG tablet, Take 100 mg by mouth Daily., Disp: , Rfl:   •  metoprolol succinate XL (TOPROL-XL) 50 MG 24 hr tablet, Take 50 mg by mouth 2 (Two) Times a Day., Disp: , Rfl:   •  multivitamin with minerals tablet tablet, Take 1 tablet by mouth Daily., Disp: , Rfl:   •  Omega-3 Fatty Acids (fish oil) 1000 MG capsule capsule, Take 1,000 mg by mouth Daily With Breakfast., Disp: , Rfl:   •  pantoprazole (PROTONIX) 40 MG EC tablet, Take 40 mg by mouth Daily., Disp: , Rfl:   •  POTASSIUM CITRATE PO, Take 1,080 mg by mouth 3 (Three) Times a Day. Patient takes 1 to 2 tablets daily, Disp: , Rfl:   •  rivaroxaban (XARELTO) 15 MG tablet, Take 15 mg by mouth Daily With Dinner., Disp: , Rfl:   •  tamsulosin (FLOMAX) 0.4 MG capsule 24 hr capsule, Take 1 capsule by mouth Daily., Disp: , Rfl:   •  vitamin B-12 (CYANOCOBALAMIN) 500 MCG tablet, Take 500 mcg by mouth Daily., Disp: , Rfl:     Allergies:   Allergies   Allergen Reactions   • Sulfa Antibiotics Rash       Objective   Vital Signs:   Vitals:    06/13/22 1521   BP: 124/70   BP Location: Left arm   Patient Position: Sitting   Pulse: 58   SpO2: 98%   Weight: 95.7 kg (211 lb)   Height: 180.3 cm (71\")       PHYSICAL EXAM  General appearance: Awake, alert, cooperative  Head: Normocephalic, without obvious abnormality, atraumatic  Eyes: Conjunctivae/corneas clear, EOMs intact  Neck: no adenopathy, no carotid bruit, no JVD and thyroid: not enlarged  Lungs: clear to auscultation bilaterally and no rhonchi or crackles\", ' symmetric  Heart: regular rate and rhythm, S1, S2 normal, no murmur, click, rub or gallop  Abdomen: Soft, non-tender, bowel sounds normal,  no organomegaly  Extremities: extremities normal, atraumatic, no cyanosis or edema  Skin: Skin color, turgor normal, no rashes or lesions  Neurologic: Grossly normal     Lab Results "   Component Value Date    GLUCOSE 114 (H) 03/29/2022    CALCIUM 9.5 03/29/2022     03/29/2022    K 4.5 03/29/2022    CO2 21.0 (L) 03/29/2022     03/29/2022    BUN 19 03/29/2022    CREATININE 1.00 03/29/2022    EGFRIFNONA 55 (L) 04/13/2021    BCR 19.0 03/29/2022    ANIONGAP 12.0 03/29/2022     Lab Results   Component Value Date    WBC 5.34 03/29/2022    HGB 15.1 03/29/2022    HCT 43.9 03/29/2022    MCV 94.0 03/29/2022     03/29/2022       Cardiac Testing:    CHADS-VASc Risk Assessment            3 Total Score    1 Hypertension    1 Age 65-74                 1      Vascular Disease    Criteria that do not apply:    CHF    Age >/= 75    DM    PRIOR STROKE/TIA/THROMBO        Sex: Female                                            HAS-Bled Score for Major Bleeding Risk       Question  Yes  No    1. Hypertension History - BP Systolic >160 mmHg, no treatment x    2.   Renal Disease - Creatinine higher than 2.6 mg/dL (200mmol/L)  x   3.   Liver Disease - Bilirubin higher 2x or AST/ALT/AP higher 3x normal  x   4.   Stroke History   x   5.   History of major bleeding or predisposition  x    6.   Labile INR - unstable/high INRs, time in Therapeutic Range <60%  x   7.   Age > 65  x    8.   Under medication that predisposes to bleeding  x    9.   Alcohol or drug usage history - more than 7 drinks per week  x            Score Major bleeding risk   0 1.10%   1 3.40%   2 4.10%   3 5.80%   4 8.90%   5 9.10%   >6 higher %             I personally viewed and interpreted the patient's EKG/Telemetry/lab data      ECG 12 Lead    Date/Time: 6/13/2022 4:07 PM  Performed by: Shaun Garcia DO  Authorized by: Shaun Garcia DO   Previous ECG: no previous ECG available  Rhythm: sinus bradycardia  Rate: normal  BPM: 58  Conduction: conduction normal  ST Segments: ST segments normal  T Waves: T waves normal  QRS axis: normal  Other: no other findings    Clinical impression: normal ECG            Tobacco Cessation:  N/A  Obstructive Sleep Apnea Screening: N/A    Assessment & Plan    Diagnoses and all orders for this visit:    1. Paroxysmal atrial fibrillation (HCC) (Primary)    2. Coronary artery disease involving native coronary artery of native heart without angina pectoris    3. Primary hypertension    4. Gastrointestinal hemorrhage, unspecified gastrointestinal hemorrhage type    Other orders  -     ECG 12 Lead         Diagnosis Plan   1. Paroxysmal atrial fibrillation (HCC)   modestly symptomatic atrial fibrillation.    Sent for consideration for left atrial Penders closure.    Patient has had recurrent GI bleeding requiring hospitalization blood transfusion.  Patient has significant risk of cardioembolic stroke.    This patient is a poor candidate for long-term anticoagulation but a reasonable candidate for short to medium term anticoagulation.  Discussed with the patient the concept of watchman device.  The patient's wife is a nurse who is retired.  They have both done some research about this procedure.    Quoted the patient a less than 1% chance of significant procedural complication including but not limited to bleeding at the groin cardiac perforation tamponade device related thrombosis device embolization requiring percutaneous snare extraction.  We discussed that the patient would require full anticoagulation for 45-day period and then transition if a MANDIE demonstrates endothelialization of the device to dual antiplatelet therapy for a longer period time and then followed ultimately by aspirin monotherapy.    Patient is interested.  He is leaning towards procedure.  Literature was provided.    We will have our watchman coordinator call him in the next couple of days and if he wishes to proceed book the case.    No need for anesthesia.  Uninterrupted anticoagulation.  Watchman.   2. Coronary artery disease involving native coronary artery of native heart without angina pectoris   stable.  Aspirin.  Statin.   3.  Primary hypertension   blood pressure reasonably well controlled.  Losartan.   4. Gastrointestinal hemorrhage, unspecified gastrointestinal hemorrhage type   high risk for complications from anticoagulation.  See above.     Body mass index is 29.43 kg/m².    I spent 47 minutes in consultation with this patient which included more than 65% of this time in direct face-to-face counseling, physical examination and discussion of my assessment and findings and shared decision making with the patient.  The remainder of the time not spent face to face was performing one, some or all of the following actions:  preparing to see this patient ( eg. Review of tests),  ordering medications, tests or procedures ), care coordination, discussion of the plan with other healthcare providers, documenting clinical information in Epic well as independently interpreting results and communicating results to patient, family and or caregiver.  All time noted occurred on the date of service.                                      HAS-Bled Score for Major Bleeding Risk       Question  Yes  No    2. Hypertension History - BP Systolic >160 mmHg, no treatment x    2.   Renal Disease - Creatinine higher than 2.6 mg/dL (200mmol/L)     3.   Liver Disease - Bilirubin higher 2x or AST/ALT/AP higher 3x normal     4.   Stroke History      5.   History of major bleeding or predisposition  x    6.   Labile INR - unstable/high INRs, time in Therapeutic Range <60%     7.   Age > 65  x    8.   Under medication that predisposes to bleeding  x    9.   Alcohol or drug usage history - more than 7 drinks per week              Score Major bleeding risk   0 1.10%   1 3.40%   2 4.10%   3 5.80%   4 8.90%   5 9.10%   >6 higher %         Total Score: 3      [id number] Score Stroke Risk %  per annum   0 0   1 1.3   2 2.2   3 3.2   4 4.0   5 6.7   6 9.8   7 9.6   8 12.2   9 15.6      .........................................................................  Definitions:  CHF or LV Dysfunction = 1  Hypertension (consistently above 140/90 mmHg (or treated hypertension on medication) = 1  Age 65 to 74 = 1 // 75 or greater = 2  Diabetes Mellitus = 1  Vascular Disease (heart or any arterial vascular bed, aortic plaque, prior MI) = 1  Female sex (as a sole risk factor this dose confer increased risk) = 1  Prior ischemic CVA or documented cardio-embolic event = 2          Thank you for allowing me to participate in the care of your patient. Please to not hesitate to contact me with additional questions or concerns.      Shaun Garcia DO, [facility], RS  Cardiac Electrophysiologist  [facility] / [facility]    Scribed for Shaun Garcia DO Electronically signed by LITA Levine, 06/13/22, 4:06 PM EDT.

## 2022-06-14 DIAGNOSIS — K92.2 GASTROINTESTINAL HEMORRHAGE, UNSPECIFIED GASTROINTESTINAL HEMORRHAGE TYPE: ICD-10-CM

## 2022-06-14 DIAGNOSIS — I48.0 PAROXYSMAL ATRIAL FIBRILLATION: Primary | ICD-10-CM

## 2022-06-28 ENCOUNTER — PREP FOR SURGERY (OUTPATIENT)
Dept: OTHER | Facility: HOSPITAL | Age: 67
End: 2022-06-28

## 2022-06-28 DIAGNOSIS — I48.0 PAROXYSMAL ATRIAL FIBRILLATION: Primary | ICD-10-CM

## 2022-06-28 RX ORDER — CEFAZOLIN SODIUM 2 G/100ML
2 INJECTION, SOLUTION INTRAVENOUS
Status: CANCELLED | OUTPATIENT
Start: 2022-06-28

## 2022-06-28 RX ORDER — SODIUM CHLORIDE 0.9 % (FLUSH) 0.9 %
10 SYRINGE (ML) INJECTION AS NEEDED
Status: CANCELLED | OUTPATIENT
Start: 2022-06-28

## 2022-06-28 RX ORDER — NITROGLYCERIN 0.4 MG/1
0.4 TABLET SUBLINGUAL
Status: CANCELLED | OUTPATIENT
Start: 2022-06-28

## 2022-06-28 RX ORDER — ACETAMINOPHEN 325 MG/1
650 TABLET ORAL EVERY 4 HOURS PRN
Status: CANCELLED | OUTPATIENT
Start: 2022-06-28

## 2022-06-28 RX ORDER — ONDANSETRON 2 MG/ML
4 INJECTION INTRAMUSCULAR; INTRAVENOUS EVERY 6 HOURS PRN
Status: CANCELLED | OUTPATIENT
Start: 2022-06-28

## 2022-06-28 RX ORDER — SODIUM CHLORIDE 0.9 % (FLUSH) 0.9 %
3 SYRINGE (ML) INJECTION EVERY 12 HOURS SCHEDULED
Status: CANCELLED | OUTPATIENT
Start: 2022-06-28

## 2022-08-03 ENCOUNTER — DOCUMENTATION (OUTPATIENT)
Dept: CARDIOLOGY | Facility: HOSPITAL | Age: 67
End: 2022-08-03

## 2022-08-03 NOTE — PROGRESS NOTES
PRE-WATCHMAN HISTORY  Justus Gaitan 1955   [address] [place] [address]   [phone] (home)     Referring MD: Dr. Saab  Information obtained from: [x] Medical record review  [x] Patient report  Scheduled for: Watchman implant on 8/9/2022 with Dr. Garcia  SDM Visit: Dr. Saab    History & Risk Factors (prior to Watchman implant)  [id number] Risk Factors:  Congestive HF     [x] No   [] Yes  LV Dysfunction   [x] No   [] Yes  Hypertension      [] No   [x] Yes  Diabetes    [x] No   [] Yes  Stroke       [x] No   [] Yes  TIA       [x] No   [] Yes  Thromboembolism    [x] No   [] Yes  Vascular Disease   [] No   [x] Yes- CAD, s/p PCI       If Yes, Type   [] Prior MI  [] PAD      [] Aortic Plaque      HAS-BLED Risk Factors:  HTN (uncontrolled) [] No  [x] Yes  Abnormal Renal Fxn  [x] No  [] Yes  Abnormal Liver Fxn   [x] No  [] Yes  Stroke            [x] No  [] Yes  Bleeding event [] No  [x] Yes- GI bleed, hematuria  Labile INR      [x] No  [] Yes  Alcohol  [x] No  [] Yes  Antiplatelets      [] No  [x] Yes  NSAIDS  [x] No  [] Yes    Additional Stroke & Bleeding Risk Factors:  Increased Fall Risk   [x] No  [] Yes  Bleeding Event         [] No  [x] Yes      If Yes, Type      [] Intracranial [] Epistaxis   [x] GI   [x] Other-hematuria    Rhythm History:  AFIBTYPE: paroxysmal    Valvular AFib        [x] No  [] Yes    Attempt at AFib Termination:  [] No  [x] Yes       If Yes, pharmacologic CV    [] No  [x] Yes       If Yes, DC cardioversion  [] No  [x] Yes       If Yes, catheter ablation  [x] No  [] Yes            If Yes, surgical ablation  [x] No  [] Yes              Atrial Flutter    [] No  [x] Yes    If Yes, pharmacologic CV    [] No  [x] Yes       If Yes, DC cardioversion  [] No  [x] Yes       If Yes, catheter ablation  [x] No  [] Yes            If Yes, surgical ablation  [x] No  [] Yes  YUSUF Intervention    [x] No  [] Yes    Additional History & Risk Factors:  Cardiomyopathy   [x] No  [] Yes  Chronic  "Lung Disease  [x] No  [] Yes  Coronary Artery Disease  [] No  [x] Yes  Sleep Apnea    [x] No  [] Yes  Epicardial Approach Considered [x] No  [] Yes    Diagnostic Studies:  Last Echo:  [x] TTE Date: 3/10/2022                  EF: 65%             LA: No measurement; \"normal in size\"             VHD: none noted          Pre-procedure blood thinner medications:  Xarelto, ASA       08/03/22 12:23 EDT   [facility] Center[facility] 1.31.2017    "

## 2022-08-08 ENCOUNTER — HOSPITAL ENCOUNTER (OUTPATIENT)
Dept: CT IMAGING | Facility: HOSPITAL | Age: 67
Discharge: HOME | End: 2022-08-08
Admitting: INTERNAL MEDICINE

## 2022-08-08 ENCOUNTER — PRE-ADMISSION TESTING (OUTPATIENT)
Dept: PREADMISSION TESTING | Facility: HOSPITAL | Age: 67
End: 2022-08-08

## 2022-08-08 DIAGNOSIS — I48.0 PAROXYSMAL ATRIAL FIBRILLATION: ICD-10-CM

## 2022-08-08 DIAGNOSIS — K92.2 GASTROINTESTINAL HEMORRHAGE, UNSPECIFIED GASTROINTESTINAL HEMORRHAGE TYPE: ICD-10-CM

## 2022-08-08 LAB
ANION GAP SERPL CALCULATED.3IONS-SCNC: 10 MMOL/L (ref 5–15)
BUN SERPL-MCNC: 19 MG/DL (ref 8–23)
BUN/CREAT SERPL: 14.7 (ref 7–25)
CALCIUM SPEC-SCNC: 9.6 MG/DL (ref 8.6–10.5)
CHLORIDE SERPL-SCNC: 104 MMOL/L (ref 98–107)
CO2 SERPL-SCNC: 27 MMOL/L (ref 22–29)
CREAT SERPL-MCNC: 1.29 MG/DL (ref 0.76–1.27)
DEPRECATED RDW RBC AUTO: 42.6 FL (ref 37–54)
EGFRCR SERPLBLD CKD-EPI 2021: 61.5 ML/MIN/1.73
ERYTHROCYTE [DISTWIDTH] IN BLOOD BY AUTOMATED COUNT: 12.4 % (ref 12.3–15.4)
FLUAV RNA RESP QL NAA+PROBE: NOT DETECTED
FLUBV RNA RESP QL NAA+PROBE: NOT DETECTED
GLUCOSE SERPL-MCNC: 130 MG/DL (ref 65–99)
HCT VFR BLD AUTO: 42.2 % (ref 37.5–51)
HGB BLD-MCNC: 14.8 G/DL (ref 13–17.7)
MCH RBC QN AUTO: 32.6 PG (ref 26.6–33)
MCHC RBC AUTO-ENTMCNC: 35.1 G/DL (ref 31.5–35.7)
MCV RBC AUTO: 93 FL (ref 79–97)
PLATELET # BLD AUTO: 186 10*3/MM3 (ref 140–450)
PMV BLD AUTO: 10.5 FL (ref 6–12)
POTASSIUM SERPL-SCNC: 4.3 MMOL/L (ref 3.5–5.2)
RBC # BLD AUTO: 4.54 10*6/MM3 (ref 4.14–5.8)
SARS-COV-2 RNA RESP QL NAA+PROBE: NOT DETECTED
SODIUM SERPL-SCNC: 141 MMOL/L (ref 136–145)
WBC NRBC COR # BLD: 4.77 10*3/MM3 (ref 3.4–10.8)

## 2022-08-08 PROCEDURE — 0 IOPAMIDOL PER 1 ML: Performed by: INTERNAL MEDICINE

## 2022-08-08 PROCEDURE — C9803 HOPD COVID-19 SPEC COLLECT: HCPCS

## 2022-08-08 PROCEDURE — 80048 BASIC METABOLIC PNL TOTAL CA: CPT

## 2022-08-08 PROCEDURE — 71275 CT ANGIOGRAPHY CHEST: CPT

## 2022-08-08 PROCEDURE — 85027 COMPLETE CBC AUTOMATED: CPT

## 2022-08-08 PROCEDURE — 87636 SARSCOV2 & INF A&B AMP PRB: CPT

## 2022-08-08 PROCEDURE — 36415 COLL VENOUS BLD VENIPUNCTURE: CPT

## 2022-08-08 RX ADMIN — IOPAMIDOL 80 ML: 755 INJECTION, SOLUTION INTRAVENOUS at 14:01

## 2022-08-09 ENCOUNTER — HOSPITAL ENCOUNTER (INPATIENT)
Facility: HOSPITAL | Age: 67
LOS: 1 days | Discharge: HOME | End: 2022-08-09
Attending: INTERNAL MEDICINE | Admitting: INTERNAL MEDICINE

## 2022-08-09 ENCOUNTER — APPOINTMENT (OUTPATIENT)
Dept: CARDIOLOGY | Facility: HOSPITAL | Age: 67
End: 2022-08-09

## 2022-08-09 VITALS
RESPIRATION RATE: 10 BRPM | DIASTOLIC BLOOD PRESSURE: 64 MMHG | WEIGHT: 205.25 LBS | OXYGEN SATURATION: 98 % | TEMPERATURE: 98.6 F | SYSTOLIC BLOOD PRESSURE: 109 MMHG | HEART RATE: 57 BPM | HEIGHT: 71 IN | BODY MASS INDEX: 28.73 KG/M2

## 2022-08-09 DIAGNOSIS — K92.2 GASTROINTESTINAL HEMORRHAGE, UNSPECIFIED GASTROINTESTINAL HEMORRHAGE TYPE: ICD-10-CM

## 2022-08-09 DIAGNOSIS — I48.0 PAROXYSMAL ATRIAL FIBRILLATION: ICD-10-CM

## 2022-08-09 LAB — ACT BLD: 480 SECONDS (ref 82–152)

## 2022-08-09 PROCEDURE — C1769 GUIDE WIRE: HCPCS | Performed by: INTERNAL MEDICINE

## 2022-08-09 PROCEDURE — 0 LIDOCAINE 1 % SOLUTION: Performed by: INTERNAL MEDICINE

## 2022-08-09 PROCEDURE — 02L73DK OCCLUSION OF LEFT ATRIAL APPENDAGE WITH INTRALUMINAL DEVICE, PERCUTANEOUS APPROACH: ICD-10-PCS | Performed by: INTERNAL MEDICINE

## 2022-08-09 PROCEDURE — 25010000002 CEFAZOLIN PER 500 MG

## 2022-08-09 PROCEDURE — 93355 ECHO TRANSESOPHAGEAL (TEE): CPT | Performed by: INTERNAL MEDICINE

## 2022-08-09 PROCEDURE — 25010000002 HEPARIN (PORCINE) PER 1000 UNITS: Performed by: INTERNAL MEDICINE

## 2022-08-09 PROCEDURE — 93355 ECHO TRANSESOPHAGEAL (TEE): CPT

## 2022-08-09 PROCEDURE — C1760 CLOSURE DEV, VASC: HCPCS | Performed by: INTERNAL MEDICINE

## 2022-08-09 PROCEDURE — 25010000002 ONDANSETRON PER 1 MG: Performed by: INTERNAL MEDICINE

## 2022-08-09 PROCEDURE — 85347 COAGULATION TIME ACTIVATED: CPT

## 2022-08-09 PROCEDURE — 33340 PERQ CLSR TCAT L ATR APNDGE: CPT | Performed by: INTERNAL MEDICINE

## 2022-08-09 PROCEDURE — S0260 H&P FOR SURGERY: HCPCS | Performed by: PHYSICIAN ASSISTANT

## 2022-08-09 PROCEDURE — 25010000002 FENTANYL CITRATE (PF) 50 MCG/ML SOLUTION: Performed by: INTERNAL MEDICINE

## 2022-08-09 PROCEDURE — 99152 MOD SED SAME PHYS/QHP 5/>YRS: CPT | Performed by: INTERNAL MEDICINE

## 2022-08-09 PROCEDURE — 99024 POSTOP FOLLOW-UP VISIT: CPT | Performed by: PHYSICIAN ASSISTANT

## 2022-08-09 PROCEDURE — C1893 INTRO/SHEATH, FIXED,NON-PEEL: HCPCS | Performed by: INTERNAL MEDICINE

## 2022-08-09 PROCEDURE — 25010000002 PROTAMINE SULFATE PER 10 MG: Performed by: INTERNAL MEDICINE

## 2022-08-09 PROCEDURE — 25010000002 MIDAZOLAM PER 1 MG: Performed by: INTERNAL MEDICINE

## 2022-08-09 PROCEDURE — C1889 IMPLANT/INSERT DEVICE, NOC: HCPCS | Performed by: INTERNAL MEDICINE

## 2022-08-09 PROCEDURE — 0 IOPAMIDOL PER 1 ML: Performed by: INTERNAL MEDICINE

## 2022-08-09 PROCEDURE — 99153 MOD SED SAME PHYS/QHP EA: CPT | Performed by: INTERNAL MEDICINE

## 2022-08-09 PROCEDURE — C1894 INTRO/SHEATH, NON-LASER: HCPCS | Performed by: INTERNAL MEDICINE

## 2022-08-09 DEVICE — CAP SYS WATCHMAN TRUSEAL ACC PROC: Type: IMPLANTABLE DEVICE | Status: FUNCTIONAL

## 2022-08-09 DEVICE — CAP WATCHMAN FLX PROC: Type: IMPLANTABLE DEVICE | Status: FUNCTIONAL

## 2022-08-09 DEVICE — LEFT ATRIAL APPENDAGE CLOSURE DEVICE WITH DELIVERY SYSTEM
Type: IMPLANTABLE DEVICE | Status: FUNCTIONAL
Brand: WATCHMAN FLX™

## 2022-08-09 RX ORDER — METOPROLOL SUCCINATE 25 MG/1
25 TABLET, EXTENDED RELEASE ORAL DAILY
Status: DISCONTINUED | OUTPATIENT
Start: 2022-08-09 | End: 2022-08-09 | Stop reason: HOSPADM

## 2022-08-09 RX ORDER — MIDAZOLAM HYDROCHLORIDE 1 MG/ML
INJECTION INTRAMUSCULAR; INTRAVENOUS AS NEEDED
Status: DISCONTINUED | OUTPATIENT
Start: 2022-08-09 | End: 2022-08-09 | Stop reason: HOSPADM

## 2022-08-09 RX ORDER — SODIUM CHLORIDE 0.9 % (FLUSH) 0.9 %
10 SYRINGE (ML) INJECTION AS NEEDED
Status: DISCONTINUED | OUTPATIENT
Start: 2022-08-09 | End: 2022-08-09 | Stop reason: HOSPADM

## 2022-08-09 RX ORDER — ACETAMINOPHEN 325 MG/1
325 TABLET ORAL EVERY 6 HOURS PRN
Status: DISCONTINUED | OUTPATIENT
Start: 2022-08-09 | End: 2022-08-09 | Stop reason: HOSPADM

## 2022-08-09 RX ORDER — SODIUM CHLORIDE 0.9 % (FLUSH) 0.9 %
3 SYRINGE (ML) INJECTION EVERY 12 HOURS SCHEDULED
Status: DISCONTINUED | OUTPATIENT
Start: 2022-08-09 | End: 2022-08-09 | Stop reason: HOSPADM

## 2022-08-09 RX ORDER — NITROGLYCERIN 0.4 MG/1
0.4 TABLET SUBLINGUAL
Status: DISCONTINUED | OUTPATIENT
Start: 2022-08-09 | End: 2022-08-09 | Stop reason: HOSPADM

## 2022-08-09 RX ORDER — ONDANSETRON 2 MG/ML
INJECTION INTRAMUSCULAR; INTRAVENOUS AS NEEDED
Status: DISCONTINUED | OUTPATIENT
Start: 2022-08-09 | End: 2022-08-09 | Stop reason: HOSPADM

## 2022-08-09 RX ORDER — LOSARTAN POTASSIUM 50 MG/1
100 TABLET ORAL DAILY
Status: DISCONTINUED | OUTPATIENT
Start: 2022-08-10 | End: 2022-08-09 | Stop reason: HOSPADM

## 2022-08-09 RX ORDER — ACETAMINOPHEN 325 MG/1
650 TABLET ORAL EVERY 4 HOURS PRN
Status: DISCONTINUED | OUTPATIENT
Start: 2022-08-09 | End: 2022-08-09 | Stop reason: HOSPADM

## 2022-08-09 RX ORDER — BUPIVACAINE HYDROCHLORIDE 5 MG/ML
INJECTION, SOLUTION EPIDURAL; INTRACAUDAL AS NEEDED
Status: DISCONTINUED | OUTPATIENT
Start: 2022-08-09 | End: 2022-08-09 | Stop reason: HOSPADM

## 2022-08-09 RX ORDER — LIDOCAINE HYDROCHLORIDE 10 MG/ML
INJECTION, SOLUTION INFILTRATION; PERINEURAL AS NEEDED
Status: DISCONTINUED | OUTPATIENT
Start: 2022-08-09 | End: 2022-08-09 | Stop reason: HOSPADM

## 2022-08-09 RX ORDER — SODIUM CHLORIDE 9 MG/ML
INJECTION, SOLUTION INTRAVENOUS CONTINUOUS PRN
Status: DISCONTINUED | OUTPATIENT
Start: 2022-08-09 | End: 2022-08-09 | Stop reason: HOSPADM

## 2022-08-09 RX ORDER — BUPIVACAINE HCL/0.9 % NACL/PF 0.1 %
2 PLASTIC BAG, INJECTION (ML) EPIDURAL
Status: COMPLETED | OUTPATIENT
Start: 2022-08-09 | End: 2022-08-09

## 2022-08-09 RX ORDER — ASPIRIN 81 MG/1
81 TABLET ORAL DAILY
Status: DISCONTINUED | OUTPATIENT
Start: 2022-08-09 | End: 2022-08-09 | Stop reason: HOSPADM

## 2022-08-09 RX ORDER — PROTAMINE SULFATE 10 MG/ML
INJECTION, SOLUTION INTRAVENOUS AS NEEDED
Status: DISCONTINUED | OUTPATIENT
Start: 2022-08-09 | End: 2022-08-09 | Stop reason: HOSPADM

## 2022-08-09 RX ORDER — HEPARIN SODIUM 1000 [USP'U]/ML
INJECTION, SOLUTION INTRAVENOUS; SUBCUTANEOUS AS NEEDED
Status: DISCONTINUED | OUTPATIENT
Start: 2022-08-09 | End: 2022-08-09 | Stop reason: HOSPADM

## 2022-08-09 RX ORDER — METOPROLOL SUCCINATE 25 MG/1
25 TABLET, EXTENDED RELEASE ORAL DAILY
COMMUNITY

## 2022-08-09 RX ORDER — ONDANSETRON 2 MG/ML
4 INJECTION INTRAMUSCULAR; INTRAVENOUS EVERY 6 HOURS PRN
Status: DISCONTINUED | OUTPATIENT
Start: 2022-08-09 | End: 2022-08-09 | Stop reason: HOSPADM

## 2022-08-09 RX ORDER — FENTANYL CITRATE 50 UG/ML
INJECTION, SOLUTION INTRAMUSCULAR; INTRAVENOUS AS NEEDED
Status: DISCONTINUED | OUTPATIENT
Start: 2022-08-09 | End: 2022-08-09 | Stop reason: HOSPADM

## 2022-08-09 RX ADMIN — ACETAMINOPHEN 325MG 325 MG: 325 TABLET ORAL at 14:48

## 2022-08-09 RX ADMIN — Medication 2 G: at 08:32

## 2022-08-10 ENCOUNTER — CALL CENTER PROGRAMS (OUTPATIENT)
Dept: CALL CENTER | Facility: HOSPITAL | Age: 67
End: 2022-08-10

## 2022-08-10 DIAGNOSIS — I48.0 PAROXYSMAL ATRIAL FIBRILLATION: Primary | ICD-10-CM

## 2022-08-10 DIAGNOSIS — Z95.818 PRESENCE OF WATCHMAN LEFT ATRIAL APPENDAGE CLOSURE DEVICE: ICD-10-CM

## 2022-08-10 NOTE — OUTREACH NOTE
PCI/Device Survey    Flowsheet Row Responses   Facility patient discharged from? [facility]   Procedure date 08/09/22   Procedure (if device, specify in description) Device   Performing MD Dr. Shaun Garcia   Attempt successful? Yes   Call start time 0946   Call end time 0951   Is the patient taking prescribed medications: ASA   Nursing intervention Reminded to continue to take prescribed medications   Does the patient have any of the following symptoms related to the cath/surgical site? --  [Right groin, dressing in place, plans to remove in shower today. ]   Nursing intervention Patient education provided   Does the patient have an appointment scheduled with the cardiologist? No   If the patient is a current smoker, are they able to teach back resources for cessation? Not a smoker   Did the patient feel prepared to go home on the same day as the procedure? Yes   Is the patient satisfied with the same day discharge process? Yes   PCI/Device call completed Yes          CRISTINE CHRISTIANSON - Registered Nurse

## 2022-08-15 LAB
LV EF 2D ECHO EST: 60 %
MAXIMAL PREDICTED HEART RATE: 155 BPM
STRESS TARGET HR: 132 BPM

## 2022-09-19 ENCOUNTER — PREP FOR SURGERY (OUTPATIENT)
Dept: OTHER | Facility: HOSPITAL | Age: 67
End: 2022-09-19

## 2022-09-19 DIAGNOSIS — Z95.818 PRESENCE OF WATCHMAN LEFT ATRIAL APPENDAGE CLOSURE DEVICE: ICD-10-CM

## 2022-09-19 DIAGNOSIS — I48.0 PAROXYSMAL ATRIAL FIBRILLATION: Primary | ICD-10-CM

## 2022-09-19 RX ORDER — SODIUM CHLORIDE 0.9 % (FLUSH) 0.9 %
10 SYRINGE (ML) INJECTION EVERY 12 HOURS SCHEDULED
Status: CANCELLED | OUTPATIENT
Start: 2022-09-19

## 2022-09-19 RX ORDER — SODIUM CHLORIDE 0.9 % (FLUSH) 0.9 %
10 SYRINGE (ML) INJECTION AS NEEDED
Status: CANCELLED | OUTPATIENT
Start: 2022-09-19

## 2022-09-28 ENCOUNTER — HOSPITAL ENCOUNTER (OUTPATIENT)
Dept: CARDIOLOGY | Facility: HOSPITAL | Age: 67
Discharge: HOME | End: 2022-09-28
Admitting: INTERNAL MEDICINE

## 2022-09-28 VITALS
SYSTOLIC BLOOD PRESSURE: 120 MMHG | HEART RATE: 70 BPM | DIASTOLIC BLOOD PRESSURE: 76 MMHG | OXYGEN SATURATION: 93 % | RESPIRATION RATE: 12 BRPM

## 2022-09-28 DIAGNOSIS — Z95.818 PRESENCE OF WATCHMAN LEFT ATRIAL APPENDAGE CLOSURE DEVICE: ICD-10-CM

## 2022-09-28 DIAGNOSIS — I48.0 PAROXYSMAL ATRIAL FIBRILLATION: ICD-10-CM

## 2022-09-28 LAB
BH CV VAS BP LEFT ARM: NORMAL MMHG
LV EF 2D ECHO EST: 55 %
MAXIMAL PREDICTED HEART RATE: 155 BPM
STRESS TARGET HR: 132 BPM

## 2022-09-28 PROCEDURE — 25010000002 MIDAZOLAM PER 1 MG: Performed by: INTERNAL MEDICINE

## 2022-09-28 PROCEDURE — 93325 DOPPLER ECHO COLOR FLOW MAPG: CPT | Performed by: INTERNAL MEDICINE

## 2022-09-28 PROCEDURE — 93325 DOPPLER ECHO COLOR FLOW MAPG: CPT

## 2022-09-28 PROCEDURE — 93321 DOPPLER ECHO F-UP/LMTD STD: CPT | Performed by: INTERNAL MEDICINE

## 2022-09-28 PROCEDURE — 93312 ECHO TRANSESOPHAGEAL: CPT

## 2022-09-28 PROCEDURE — 25010000002 FENTANYL CITRATE (PF) 50 MCG/ML SOLUTION: Performed by: INTERNAL MEDICINE

## 2022-09-28 PROCEDURE — 93312 ECHO TRANSESOPHAGEAL: CPT | Performed by: INTERNAL MEDICINE

## 2022-09-28 PROCEDURE — 99152 MOD SED SAME PHYS/QHP 5/>YRS: CPT

## 2022-09-28 PROCEDURE — 93321 DOPPLER ECHO F-UP/LMTD STD: CPT

## 2022-09-28 RX ORDER — CLOPIDOGREL BISULFATE 75 MG/1
75 TABLET ORAL DAILY
Qty: 30 TABLET | Refills: 6 | Status: SHIPPED | OUTPATIENT
Start: 2022-09-28 | End: 2023-02-10

## 2022-09-28 RX ORDER — FENTANYL CITRATE 50 UG/ML
INJECTION, SOLUTION INTRAMUSCULAR; INTRAVENOUS
Status: COMPLETED | OUTPATIENT
Start: 2022-09-28 | End: 2022-09-28

## 2022-09-28 RX ORDER — MIDAZOLAM HYDROCHLORIDE 1 MG/ML
INJECTION INTRAMUSCULAR; INTRAVENOUS
Status: COMPLETED | OUTPATIENT
Start: 2022-09-28 | End: 2022-09-28

## 2022-09-28 RX ADMIN — FENTANYL CITRATE 50 MCG: 50 INJECTION, SOLUTION INTRAMUSCULAR; INTRAVENOUS at 10:22

## 2022-09-28 RX ADMIN — MIDAZOLAM 2 MG: 1 INJECTION INTRAMUSCULAR; INTRAVENOUS at 10:19

## 2022-09-28 RX ADMIN — MIDAZOLAM 1 MG: 1 INJECTION INTRAMUSCULAR; INTRAVENOUS at 10:23

## 2022-09-28 NOTE — H&P
[facility]  Cardiovascular History and Physical Note         Patient is a 66-year-old gentleman with a history of coronary artery disease status post PCI in 2017, paroxysmal atrial fibrillation/flutter, hypertension and hyperlipidemia.  The patient had a GI bleed on anticoagulation and ultimately underwent placement of watchman device with Dr. Garcia on 8/9/2022.  He presents today to undergo transesophageal echo cardiogram for surveillance of his device. He has been anticoagulated with Xarelto.         Past medical and surgical history, social and family history reviewed in EMR.    REVIEW OF SYSTEMS:   H&P ROS reviewed and pertinent CV ROS as noted in HPI.         Vital Sign Min/Max for last 24 hours  No data recorded   BP  Min: 166/93  Max: 166/93   Pulse  Min: 63  Max: 63   Resp  Min: 12  Max: 12   SpO2  Min: 99 %  Max: 99 %   No data recorded    No intake or output data in the 24 hours ending 09/28/22 0939        Constitutional:       Appearance: Healthy appearance. Well-developed.   Eyes:      General: Lids are normal. No scleral icterus.     Conjunctiva/sclera: Conjunctivae normal.   HENT:      Head: Normocephalic and atraumatic.   Neck:      Thyroid: No thyromegaly.      Vascular: No carotid bruit or JVD.   Pulmonary:      Effort: Pulmonary effort is normal.      Breath sounds: Normal breath sounds. No wheezing. No rhonchi. No rales.   Cardiovascular:      Normal rate. Regular rhythm.      Murmurs: There is no murmur.      No gallop. No rub.   Pulses:     Intact distal pulses.   Edema:     Peripheral edema absent.   Abdominal:      General: There is no distension.      Palpations: Abdomen is soft. There is no abdominal mass.   Musculoskeletal:      Cervical back: Normal range of motion. Skin:     General: Skin is warm and dry.      Findings: No rash.   Neurological:      General: No focal deficit present.      Mental Status: Alert and oriented to person, place, and time.       Gait: Gait is intact.   Psychiatric:         Attention and Perception: Attention normal.         Mood and Affect: Mood normal.         Behavior: Behavior normal.         Lab Review:   Labs reviewed in the electronic medical record.  Pertinent findings include:  Lab Results   Component Value Date    GLUCOSE 130 (H) 08/08/2022    BUN 19 08/08/2022    CREATININE 1.29 (H) 08/08/2022    EGFRIFNONA 55 (L) 04/13/2021    BCR 14.7 08/08/2022    K 4.3 08/08/2022    CO2 27.0 08/08/2022    CALCIUM 9.6 08/08/2022    ALBUMIN 4.70 04/13/2021    AST 34 04/13/2021    ALT 32 04/13/2021     Lab Results   Component Value Date    WBC 4.77 08/08/2022    HGB 14.8 08/08/2022    HCT 42.2 08/08/2022    MCV 93.0 08/08/2022     08/08/2022     No results found for: CHOL, CHLPL, TRIG, HDL, LDL, LDLDIRECT             Active Hospital Problems    Diagnosis     Presence of Watchman left atrial appendage closure device     Atrial fibrillation (HCC)      Afib/ A flutter- required manual cardioversion April 2021  Echo, 3/10/2022: Normal LV, EF 65%.  Left atrium normal size.  Normal valvular morphology.  Left atrial appendage closure with 27 mm Watchman FLX device, 8-9-22      Hyperlipidemia     Hypertension    Patient presents today to undergo transesophageal echocardiogram for surveillance of his watchman device.  The risk and benefits of the procedure were discussed and the patient is agreeable to proceed.         Proceed with transesophageal echocardiogram  If Watchman device well seated without leaking can d/c Xarelto and start DAPT  Further recommendations to follow      Marcia KIM      Patient's watchman device is well-seated.  There is no flow around the device.  We will stop the patient's Xarelto today.  We will prescribe Plavix 75 mg daily.  Continue aspirin 81 mg daily.

## 2022-11-14 ENCOUNTER — OFFICE VISIT (OUTPATIENT)
Dept: CARDIOLOGY | Facility: CLINIC | Age: 67
End: 2022-11-14

## 2022-11-14 VITALS
DIASTOLIC BLOOD PRESSURE: 70 MMHG | HEART RATE: 98 BPM | SYSTOLIC BLOOD PRESSURE: 124 MMHG | BODY MASS INDEX: 28.92 KG/M2 | WEIGHT: 206.6 LBS | HEIGHT: 71 IN | OXYGEN SATURATION: 99 %

## 2022-11-14 DIAGNOSIS — K92.2 GASTROINTESTINAL HEMORRHAGE, UNSPECIFIED GASTROINTESTINAL HEMORRHAGE TYPE: ICD-10-CM

## 2022-11-14 DIAGNOSIS — I25.10 CORONARY ARTERY DISEASE INVOLVING NATIVE CORONARY ARTERY OF NATIVE HEART WITHOUT ANGINA PECTORIS: ICD-10-CM

## 2022-11-14 DIAGNOSIS — Z95.818 PRESENCE OF WATCHMAN LEFT ATRIAL APPENDAGE CLOSURE DEVICE: Primary | ICD-10-CM

## 2022-11-14 DIAGNOSIS — I48.0 PAROXYSMAL ATRIAL FIBRILLATION: ICD-10-CM

## 2022-11-14 PROCEDURE — 99214 OFFICE O/P EST MOD 30 MIN: CPT | Performed by: INTERNAL MEDICINE

## 2022-11-14 NOTE — PROGRESS NOTES
Cardiac Electrophysiology Outpatient Follow Up Note            [facility]    Follow Up Office Visit      Justus Gaitan  [id number]  11/14/2022  [unfilled]  [unfilled]    Primary Care Physician: Kelby Kidd MD    Referred By: No ref. provider found    Subjective     Chief Complaint:   Diagnoses and all orders for this visit:    1. Presence of Watchman left atrial appendage closure device (Primary)    2. Paroxysmal atrial fibrillation (HCC)    3. Coronary artery disease involving native coronary artery of native heart without angina pectoris    4. Gastrointestinal hemorrhage, unspecified gastrointestinal hemorrhage type      Chief Complaint   Patient presents with   • Paroxysmal atrial fibrillation (HCC)     Follow up       History of Present Illness:   Justus Gaitan is a 67 y.o. male who presents to my electrophysiology clinic for follow up of above complaints.  Doing well.  No chest pain nausea vomit fever chills syncope presyncope.  Recovered from his watchman procedure well..      Review of Systems:   Constitutional: No fevers or chills, no recent weight gain or weight loss or fatigue  Eyes: No visual loss, blurred vision, double vision, yellow sclerae.  ENT: No headaches, hearing loss, vertigo, congestion or sore throat.   Cardiovascular: Per HPI  Respiratory: No cough or wheezing, no sputum production, no hematemesis   Gastrointestinal: No abdominal pain, no nausea, vomiting, constipation, diarrhea, melena.   Genitourinary: No dysuria, hematuria or increased frequency.  Musculoskeletal:  No gait disturbance, weakness or joint pain or stiffness    Past Medical History:   Past Medical History:   Diagnosis Date   • Atrial fibrillation (HCC) 04/2021    Afib/ A flutter- required manual cardioversion   • BPH (benign prostatic hyperplasia)    • Colon disorder     floppy colon   • Coronary artery disease    • Diverticulosis    • JOHNSON (dyspnea  on exertion)    • GERD (gastroesophageal reflux disease)    • History of COVID-19     2021 ( infusion) / 2022 no rx   • Hyperlipidemia    • Hypertension    • Kidney stones    • Renal cyst, right    • TB (tuberculosis), treated    • Torn meniscus     left knee   • Wears reading eyeglasses        Past Surgical History:   Past Surgical History:   Procedure Laterality Date   • ATRIAL APPENDAGE EXCLUSION LEFT WITH TRANSESOPHAGEAL ECHOCARDIOGRAM N/A 2022    Procedure: Atrial Appendage Occlusion on xarelto 15 mg daily DNS;  Surgeon: Shaun Garcia DO;  Location:  [facility];  Service: Cardiology;  Laterality: N/A;   • CARDIAC CATHETERIZATION N/A 2017    Procedure: Left Heart Cath;  Surgeon: Aidan Cesar MD;  Location:  [facility];  Service:    • CARDIAC CATHETERIZATION N/A 2022    Procedure: Left Heart Cath 35960;  Surgeon: Roni Saab MD;  Location:  [facility];  Service: Cardiology;  Laterality: N/A;   • CATARACT EXTRACTION Bilateral    • COLONOSCOPY     • CORONARY STENT PLACEMENT  2017   • RETINAL DETACHMENT REPAIR Left    • TONSILLECTOMY         Family History:   Family History   Problem Relation Age of Onset   • Arrhythmia Mother    • Hypertension Mother    • Hypertension Father        Social History:   Social History     Socioeconomic History   • Marital status:    Tobacco Use   • Smoking status: Former     Packs/day: 0.25     Years: 3.00     Pack years: 0.75     Types: Cigarettes     Quit date: 1978     Years since quittin.8   • Smokeless tobacco: Never   Vaping Use   • Vaping Use: Never used   Substance and Sexual Activity   • Alcohol use: Yes     Alcohol/week: 2.0 standard drinks     Types: 2 Cans of beer per week     Comment: Or less   • Drug use: No   • Sexual activity: Yes     Partners: Female     Birth control/protection: None     Comment: Old age       Medications:     Current Outpatient  "Medications:   •  APPLE CIDER VINEGAR PO, Take 450 mg by mouth Daily., Disp: , Rfl:   •  aspirin 81 MG EC tablet, Take 81 mg by mouth Daily., Disp: , Rfl:   •  atorvastatin (LIPITOR) 40 MG tablet, Take 40 mg by mouth Every Night., Disp: , Rfl:   •  clopidogrel (PLAVIX) 75 MG tablet, Take 1 tablet by mouth Daily., Disp: 30 tablet, Rfl: 6  •  finasteride (PROSCAR) 5 MG tablet, Take 5 mg by mouth Daily., Disp: , Rfl:   •  losartan (COZAAR) 100 MG tablet, Take 100 mg by mouth Daily., Disp: , Rfl:   •  metoprolol succinate XL (TOPROL-XL) 25 MG 24 hr tablet, Take 25 mg by mouth Daily., Disp: , Rfl:   •  multivitamin with minerals tablet tablet, Take 1 tablet by mouth Daily., Disp: , Rfl:   •  Omega-3 Fatty Acids (fish oil) 1000 MG capsule capsule, Take 2,000 mg by mouth Daily With Breakfast., Disp: , Rfl:   •  pantoprazole (PROTONIX) 40 MG EC tablet, Take 40 mg by mouth Daily., Disp: , Rfl:   •  POTASSIUM CITRATE PO, Take 1,080 mg by mouth 3 (Three) Times a Day. Patient takes 1 to 2 tablets daily, Disp: , Rfl:   •  tamsulosin (FLOMAX) 0.4 MG capsule 24 hr capsule, Take 1 capsule by mouth Daily., Disp: , Rfl:   •  vitamin B-12 (CYANOCOBALAMIN) 500 MCG tablet, Take 500 mcg by mouth Daily., Disp: , Rfl:   •  vitamin D3 125 MCG (5000 UT) capsule capsule, Take 5,000 Units by mouth Daily., Disp: , Rfl:   •  Wheat Dextrin (BENEFIBER PO), Take 1 dose by mouth As Needed., Disp: , Rfl:     Allergies:   Allergies   Allergen Reactions   • Sulfa Antibiotics Rash       Objective   Vital Signs:   Vitals:    11/14/22 1106   BP: 124/70   BP Location: Left arm   Patient Position: Sitting   Pulse: 98   SpO2: 99%   Weight: 93.7 kg (206 lb 9.6 oz)   Height: 180.3 cm (71\")       PHYSICAL EXAM  General appearance: Awake, alert, cooperative  Head: Normocephalic, without obvious abnormality, atraumatic  Eyes: Conjunctivae/corneas clear, EOMs intact  Neck: no adenopathy, no carotid bruit, no JVD and thyroid: not enlarged  Lungs: clear to " "auscultation bilaterally and no rhonchi or crackles\", ' symmetric  Heart: regular rate and rhythm, S1, S2 normal, no murmur, click, rub or gallop  Abdomen: Soft, non-tender, bowel sounds normal,  no organomegaly  Extremities: extremities normal, atraumatic, no cyanosis or edema  Skin: Skin color, turgor normal, no rashes or lesions  Neurologic: Grossly normal     Lab Results   Component Value Date    GLUCOSE 130 (H) 08/08/2022    CALCIUM 9.6 08/08/2022     08/08/2022    K 4.3 08/08/2022    CO2 27.0 08/08/2022     08/08/2022    BUN 19 08/08/2022    CREATININE 1.29 (H) 08/08/2022    EGFRIFNONA 55 (L) 04/13/2021    BCR 14.7 08/08/2022    ANIONGAP 10.0 08/08/2022     Lab Results   Component Value Date    WBC 4.77 08/08/2022    HGB 14.8 08/08/2022    HCT 42.2 08/08/2022    MCV 93.0 08/08/2022     08/08/2022     No results found for: INR, PROTIME  No results found for: TSH, J3CVTQV, A7BZXKW, THYROIDAB    Cardiac Testing:     I personally viewed and interpreted the patient's EKG/Telemetry/lab data    Procedures    Tobacco Cessation: N/A  Obstructive Sleep Apnea Screening: Completed    Assessment & Plan    Diagnoses and all orders for this visit:    1. Presence of Watchman left atrial appendage closure device (Primary)    2. Paroxysmal atrial fibrillation (HCC)    3. Coronary artery disease involving native coronary artery of native heart without angina pectoris    4. Gastrointestinal hemorrhage, unspecified gastrointestinal hemorrhage type         Diagnosis Plan   1. Presence of Watchman left atrial appendage closure device   endothelialization.    27 mm device.    Dual antiplatelet therapy.    Watchman coordinator will switch to aspirin monotherapy at the appropriate time.      2. Paroxysmal atrial fibrillation (HCC)   at this time no significant symptoms.    Watchman in situ.      3. Coronary artery disease involving native coronary artery of native heart without angina pectoris   aspirin.      4. " Gastrointestinal hemorrhage, unspecified gastrointestinal hemorrhage type   no further recurrence since watchman implantation        Body mass index is 28.81 kg/m².    I spent 37 minutes in consultation with this patient which included more than 65% of this time in direct face-to-face counseling, physical examination and discussion of my assessment and findings and shared decision making with the patient.  The remainder of the time not spent face to face was performing one, some or all of the following actions:  preparing to see this patient ( eg. Review of tests),  ordering medications, tests or procedures ), care coordination, discussion of the plan with other healthcare providers, documenting clinical information in Epic well as independently interpreting results and communicating results to patient, family and or caregiver.  All time noted occurred on the date of service.    Follow Up:       Thank you for allowing me to participate in the care of your patient. Please to not hesitate to contact me with additional questions or concerns.      Shaun Garcia DO, FACC, RS  Cardiac Electrophysiologist  [facility] / [facility]

## 2022-11-30 ENCOUNTER — TELEPHONE (OUTPATIENT)
Dept: UROLOGY | Facility: CLINIC | Age: 67
End: 2022-11-30

## 2022-11-30 NOTE — TELEPHONE ENCOUNTER
Laisha, patient's wife calling inquiring about patient's referral to urology.      Laisha would like to know when patient can be seen here at our office.    Afshan, please advise.  Thank you.

## 2022-12-08 ENCOUNTER — TELEPHONE (OUTPATIENT)
Dept: UROLOGY | Facility: CLINIC | Age: 67
End: 2022-12-08

## 2022-12-08 ENCOUNTER — OFFICE VISIT (OUTPATIENT)
Dept: UROLOGY | Facility: CLINIC | Age: 67
End: 2022-12-08

## 2022-12-08 VITALS — WEIGHT: 206 LBS | BODY MASS INDEX: 28.84 KG/M2 | HEIGHT: 71 IN

## 2022-12-08 DIAGNOSIS — N28.1 RENAL CYST: ICD-10-CM

## 2022-12-08 DIAGNOSIS — N52.9 ERECTILE DYSFUNCTION, UNSPECIFIED ERECTILE DYSFUNCTION TYPE: ICD-10-CM

## 2022-12-08 DIAGNOSIS — R35.0 BENIGN PROSTATIC HYPERPLASIA WITH URINARY FREQUENCY: Primary | ICD-10-CM

## 2022-12-08 DIAGNOSIS — N20.0 RENAL STONES: ICD-10-CM

## 2022-12-08 DIAGNOSIS — N40.1 BENIGN PROSTATIC HYPERPLASIA WITH URINARY FREQUENCY: Primary | ICD-10-CM

## 2022-12-08 DIAGNOSIS — Z87.448 HISTORY OF HEMATURIA: ICD-10-CM

## 2022-12-08 LAB
BILIRUB BLD-MCNC: NEGATIVE MG/DL
CLARITY, POC: CLEAR
COLOR UR: YELLOW
EXPIRATION DATE: ABNORMAL
GLUCOSE UR STRIP-MCNC: NEGATIVE MG/DL
KETONES UR QL: NEGATIVE
LEUKOCYTE EST, POC: NEGATIVE
Lab: ABNORMAL
NITRITE UR-MCNC: NEGATIVE MG/ML
PH UR: 6 [PH] (ref 5–8)
PROT UR STRIP-MCNC: NEGATIVE MG/DL
RBC # UR STRIP: ABNORMAL /UL
SP GR UR: 1.01 (ref 1–1.03)
UROBILINOGEN UR QL: NORMAL

## 2022-12-08 PROCEDURE — 81003 URINALYSIS AUTO W/O SCOPE: CPT | Performed by: STUDENT IN AN ORGANIZED HEALTH CARE EDUCATION/TRAINING PROGRAM

## 2022-12-08 PROCEDURE — 99205 OFFICE O/P NEW HI 60 MIN: CPT | Performed by: STUDENT IN AN ORGANIZED HEALTH CARE EDUCATION/TRAINING PROGRAM

## 2022-12-08 PROCEDURE — 51798 US URINE CAPACITY MEASURE: CPT | Performed by: STUDENT IN AN ORGANIZED HEALTH CARE EDUCATION/TRAINING PROGRAM

## 2022-12-08 NOTE — TELEPHONE ENCOUNTER
----- Message from JULIO Schulz sent at 12/8/2022  3:47 PM EST -----  Hi,     Would you mind to obtain all of his imaging (CT x2 and MRI Abdomen) from [facility] in [place]. We have reports but no disc. Thank you!    Joelle Chase.

## 2022-12-08 NOTE — PROGRESS NOTES
Office Visit : Renal Lesion     Patient Name: Justus Gaitan  : 1955   MRN: [id number]     Chief Complaint:  Renal Lesion  Chief Complaint   Patient presents with   • Renal cyst   • Benign Prostatic Hypertrophy   • Erectile Dysfunction       Referring Provider: Kelby Kidd MD    History of Present Illness: Justus Gaitan is a 67 y.o. male with past medical history significant for nephrolithiasis, renal cysts, lower urinary tract symptoms and erectile dysfunction. He has previously seen Dr. Mccain in [facility].     He underwent comprehensive hematuria workup with Dr. Mccain in . At that time he was taking Xarelto for hx of cardiac stent and Afib. He is a nonsmoker. Former smoker 40 years ago for 3-4 years. No family hx of kidney or bladder cancer. Per chart review, CT A/P w/wo contrast on 21 revealed a 2.3 cm lesion in the right kidney which was concerning for possible enhancement. MRI on 2021 revealed 2.6 x 1.9 cm lesion in the periphery of the inferior right kidney without enhancement, which appeared to be a minimally complex cyst.     CT A/P w/wo in  revealed small bilateral nonobstructing renal calculi, unchanged 5mm exophytic high density renal lesion (cyst) and prostate enlargement with impression on the base of the urinary bladder.     Cystoscopy in  with Dr. Mccain, per chart review patient was found to have 3+ bladder trabeculation, moderate intravesical lobe prostate enlargement. He is currently managed on Flomax 0.4mg daily and Finasteride 5mg daily.     He recently had Watchman Procedure in  for his AFIB. He is currently managed with Plavix. He is hoping to discontinue his Plavix in .     He reports several year hx of nephrolithiasis with his last stone procedure in . He is currently taking Potassium Citrate. He reports minimal water intake. He usually drinks two sprites a day.     He is currently  taking Viagra 100mg as needed for erectile dysfunction. He also has a prescription for Cialis at home but he is unsure of the dose.     Subjective      Review of System: Review of Systems   Genitourinary: Positive for frequency.      I have reviewed the ROS documented by my clinical staff, I have updated appropriately and I agree. JULIO Schulz    Past Medical History:   Past Medical History:   Diagnosis Date   • Atrial fibrillation (HCC) 04/2021    Afib/ A flutter- required manual cardioversion   • BPH (benign prostatic hyperplasia)    • Colon disorder     floppy colon   • Coronary artery disease    • Diverticulosis    • JOHNSON (dyspnea on exertion)    • GERD (gastroesophageal reflux disease)    • History of COVID-19     01/2021 ( infusion) / 1/2022 no rx   • Hyperlipidemia    • Hypertension    • Kidney stones    • Renal cyst, right    • TB (tuberculosis), treated    • Torn meniscus     left knee   • Wears reading eyeglasses        Past Surgical History:   Past Surgical History:   Procedure Laterality Date   • ATRIAL APPENDAGE EXCLUSION LEFT WITH TRANSESOPHAGEAL ECHOCARDIOGRAM N/A 8/9/2022    Procedure: Atrial Appendage Occlusion on xarelto 15 mg daily DNS;  Surgeon: Shaun Garcia DO;  Location:  [facility];  Service: Cardiology;  Laterality: N/A;   • CARDIAC CATHETERIZATION N/A 03/08/2017    Procedure: Left Heart Cath;  Surgeon: Aidan Cesar MD;  Location:  [facility];  Service:    • CARDIAC CATHETERIZATION N/A 03/29/2022    Procedure: Left Heart Cath 87258;  Surgeon: Roni Saab MD;  Location:  [facility];  Service: Cardiology;  Laterality: N/A;   • CATARACT EXTRACTION Bilateral    • COLONOSCOPY     • CORONARY STENT PLACEMENT  March 8, 2017   • RETINAL DETACHMENT REPAIR Left    • TONSILLECTOMY         Family History:   Family History   Problem Relation Age of Onset   • Arrhythmia Mother    • Hypertension Mother    • Hypertension Father         Social History:   Social History     Socioeconomic History   • Marital status:    Tobacco Use   • Smoking status: Former     Packs/day: 0.25     Years: 3.00     Pack years: 0.75     Types: Cigarettes     Quit date: 1978     Years since quittin.9   • Smokeless tobacco: Never   Vaping Use   • Vaping Use: Never used   Substance and Sexual Activity   • Alcohol use: Yes     Alcohol/week: 2.0 standard drinks     Types: 2 Cans of beer per week     Comment: Or less   • Drug use: No   • Sexual activity: Yes     Partners: Female     Birth control/protection: None     Comment: Old age       Medications:     Current Outpatient Medications:   •  APPLE CIDER VINEGAR PO, Take 450 mg by mouth Daily., Disp: , Rfl:   •  aspirin 81 MG EC tablet, Take 81 mg by mouth Daily., Disp: , Rfl:   •  atorvastatin (LIPITOR) 40 MG tablet, Take 40 mg by mouth Every Night., Disp: , Rfl:   •  clopidogrel (PLAVIX) 75 MG tablet, Take 1 tablet by mouth Daily., Disp: 30 tablet, Rfl: 6  •  finasteride (PROSCAR) 5 MG tablet, Take 5 mg by mouth Daily., Disp: , Rfl:   •  losartan (COZAAR) 100 MG tablet, Take 100 mg by mouth Daily., Disp: , Rfl:   •  metoprolol succinate XL (TOPROL-XL) 25 MG 24 hr tablet, Take 25 mg by mouth Daily., Disp: , Rfl:   •  multivitamin with minerals tablet tablet, Take 1 tablet by mouth Daily., Disp: , Rfl:   •  Omega-3 Fatty Acids (fish oil) 1000 MG capsule capsule, Take 2,000 mg by mouth Daily With Breakfast., Disp: , Rfl:   •  pantoprazole (PROTONIX) 40 MG EC tablet, Take 40 mg by mouth Daily., Disp: , Rfl:   •  POTASSIUM CITRATE PO, Take 1,080 mg by mouth 3 (Three) Times a Day. Patient takes 1 to 2 tablets daily, Disp: , Rfl:   •  tamsulosin (FLOMAX) 0.4 MG capsule 24 hr capsule, Take 1 capsule by mouth Daily., Disp: , Rfl:   •  vitamin B-12 (CYANOCOBALAMIN) 500 MCG tablet, Take 500 mcg by mouth Daily., Disp: , Rfl:   •  vitamin D3 125 MCG (5000 UT) capsule capsule, Take 5,000 Units by mouth Daily., Disp:  , Rfl:   •  Wheat Dextrin (BENEFIBER PO), Take 1 dose by mouth As Needed., Disp: , Rfl:     Allergies:   Allergies   Allergen Reactions   • Sulfa Antibiotics Rash       IPSS Questionnaire (AUA-7):  Over the past month…    1)  Incomplete Emptying:       How often have you had a sensation of not emptying you had the sensation of not emptying your bladder completely after you finished urinating?  0 - Not at all   2)  Frequency:       How often have you had the urinate again less than two hours after you finished urinating?  2 - Less than half the time   3)  Intermittency:       How often have you found you stopped and started again several times when you urinated?   3 - About half the time   4) Urgency:      How often have you found it difficult to postpone urination?  3 - About half the time   5) Weak Stream:      How often have you had a weak urinary stream?  2 - Less than half the time   6) Straining:       How often have you had to push or strain to begin urination?  2 - Less than half the time   7) Nocturia:      How many times did you most typically get up to urinate from the time you went to bed at night until the time you got up in the morning?  2 - 2 times   Total Score:  14   The International Prostate Symptom Score (IPSS) is used to screen, diagnose, track symptoms of benign prostatic hyperplasia (BPH).   0-7 (Mild Symptoms) 8-19 (Moderate) 20-35 (Severe)   Quality of Life (QoL):  If you were to spend the rest of your life with your urinary condition just the way it is now, how would you feel about that? 1-Pleased   Urine Leakage (Incontinence) 0-No Leakage       Sexual Health Inventory for Men (OSORIO)   Over the past 6 months:     1. How do you rate your confidence that you could get and keep an erection?  2 - Low   2. When you had erections with sexual  stimulation, how often were your erections hard enough for penetration (entering your partner)?  2 - A few times (much less than half the time)   3. During  "sexual intercourse, how often were you able to maintain your erection after you had penetrated (entered) your partner?  2 - A few times (much less than half the time)   4. During sexual intercourse, how difficult was it to maintain your erection to completion of intercourse?  2 - Very difficult    5. When you attempted sexual intercourse, how often was it satisfactory for you?  2 - A few times (much less than half the time)    Total Score: 10   The Sexual Health Inventory for Men further classifies ED severity with the following breakpoints:   1-7 (Severe ED) 8-11 (Moderate ED) 12-16 (Mild to Moderate ED) 17-21 (Mild ED)      Post void residual bladder scan:   176 mL    Objective     Physical Exam:   Vital Signs:   Vitals:    12/08/22 1438   Weight: 93.4 kg (206 lb)   Height: 180.3 cm (71\")     Body mass index is 28.73 kg/m².     Physical Exam  Constitutional:       Appearance: Normal appearance.   HENT:      Head: Normocephalic and atraumatic.      Nose: Nose normal.   Eyes:      Extraocular Movements: Extraocular movements intact.      Conjunctiva/sclera: Conjunctivae normal.      Pupils: Pupils are equal, round, and reactive to light.   Musculoskeletal:         General: Normal range of motion.      Cervical back: Normal range of motion and neck supple.   Skin:     General: Skin is warm and dry.      Findings: No lesion or rash.   Neurological:      General: No focal deficit present.      Mental Status: He is alert and oriented to person, place, and time. Mental status is at baseline.   Psychiatric:         Mood and Affect: Mood normal.         Behavior: Behavior normal.         Labs:   Brief Urine Lab Results  (Last result in the past 365 days)      Color   Clarity   Blood   Leuk Est   Nitrite   Protein   CREAT   Urine HCG        12/08/22 1455 Yellow   Clear   Trace   Negative   Negative   Negative                      Lab Results   Component Value Date    GLUCOSE 130 (H) 08/08/2022    CALCIUM 9.6 08/08/2022    "  08/08/2022    K 4.3 08/08/2022    CO2 27.0 08/08/2022     08/08/2022    BUN 19 08/08/2022    CREATININE 1.29 (H) 08/08/2022    EGFRIFNONA 55 (L) 04/13/2021    BCR 14.7 08/08/2022    ANIONGAP 10.0 08/08/2022       Lab Results   Component Value Date    WBC 4.77 08/08/2022    HGB 14.8 08/08/2022    HCT 42.2 08/08/2022    MCV 93.0 08/08/2022     08/08/2022       Images:   No Images in the past 120 days found..    Measures:   Tobacco:   Justus Gaitan  reports that he quit smoking about 44 years ago. His smoking use included cigarettes. He has a 0.75 pack-year smoking history. He has never used smokeless tobacco..        Urine Incontinence: ( NOUI)  Assessment / Plan      Assessment/Plan:   Justus Gaitan is a 67 y.o. male who presented today for evaluation of renal cyst, erectile dysfunction, BPH with LUTS and renal stones.     In regards to his bilateral renal cysts, his larger right renal cyst has been thoroughly imaged with multiple CT and MRI which demonstrates a simple cyst.  His left renal lesion is small and hyperdense and likely a proteinaceous or hemorrhagic cyst, images not currently available for review.  Discussed with patient based on findings and his previous urologic evaluation I recommend continued surveillance with ultrasound.  He desires to transition his urologic care to [facility].    Diagnoses and all orders for this visit:    His bladder scan PVR today is 176 cc and his IPSS is 14.   He will return to the clinic on January 3rd, 2023 for Cystoscopy/TRUS to evaluate prostate size/shape and overall health of the bladder. He will also return to the clinic in 6 months with renal U/S prior to reassess bilateral renal calculi and renal cysts. He is understanding and agreeable to proceed.  Risk benefits and alternatives of flexible cystoscopy discussed.    1. Benign prostatic hyperplasia with urinary frequency (Primary)  -     POC Urinalysis Dipstick,  Automated  - Plan for Cystoscopy, TRUS with Dr. Crum in Jan, 2023.     2. Renal stones  -Per CT report, nonobstructing small bilateral stones, will reassess with ultrasound to determine if elective treatment is warranted, he can continue his potassium citrate as previously prescribed by his outside urologist  -We discussed obtaining PTH/intact calcium levels and 24-hour urine study for metabolic evaluation in future if he is interested in    -     US Renal Bilateral; Future    3. Erectile dysfunction, unspecified erectile dysfunction type  -Continue Viagra as needed, also discussed intracavernosal injection in future if necessary    4. Renal cyst  -     US Renal Bilateral; Future    5. History of hematuria  -Negative hematuria work-up including remote flexible cystoscopy and multiple CT and MRI imaging of the abdomen previously, trace blood on urinalysis today, will reassess with flexible cystoscopy next available    Follow Up:   Return in about 6 months (around 6/8/2023) for F/U with Dr. Crum.    I spent approximately 60 minutes providing clinical care for this patient; including review of patient's chart and provider documentation, face to face time spent with patient in examination room (obtaining history, performing physical exam, discussing diagnosis and management options), placing orders, and completing patient documentation.     Kelby Crum MD  [facility] [place]

## 2022-12-08 NOTE — TELEPHONE ENCOUNTER
Fax request for CT x 2 and MRI to [facility].       Fax:  [phone]  Phone:  [phone]

## 2022-12-23 ENCOUNTER — APPOINTMENT (OUTPATIENT)
Dept: ULTRASOUND IMAGING | Facility: HOSPITAL | Age: 67
End: 2022-12-23

## 2023-01-03 ENCOUNTER — TELEPHONE (OUTPATIENT)
Dept: CARDIOLOGY | Facility: CLINIC | Age: 68
End: 2023-01-03
Payer: MEDICARE

## 2023-01-03 ENCOUNTER — PROCEDURE VISIT (OUTPATIENT)
Dept: UROLOGY | Facility: CLINIC | Age: 68
End: 2023-01-03
Payer: MEDICARE

## 2023-01-03 VITALS — BODY MASS INDEX: 28.73 KG/M2 | HEIGHT: 71 IN

## 2023-01-03 DIAGNOSIS — R35.0 BENIGN PROSTATIC HYPERPLASIA WITH URINARY FREQUENCY: Primary | ICD-10-CM

## 2023-01-03 DIAGNOSIS — N40.1 BENIGN PROSTATIC HYPERPLASIA WITH URINARY FREQUENCY: Primary | ICD-10-CM

## 2023-01-03 DIAGNOSIS — Z87.448 HISTORY OF HEMATURIA: ICD-10-CM

## 2023-01-03 LAB
BILIRUB BLD-MCNC: NEGATIVE MG/DL
CLARITY, POC: CLEAR
COLOR UR: YELLOW
EXPIRATION DATE: NORMAL
GLUCOSE UR STRIP-MCNC: NEGATIVE MG/DL
KETONES UR QL: NEGATIVE
LEUKOCYTE EST, POC: NEGATIVE
Lab: NORMAL
NITRITE UR-MCNC: NEGATIVE MG/ML
PH UR: 6 [PH] (ref 5–8)
PROT UR STRIP-MCNC: NEGATIVE MG/DL
RBC # UR STRIP: NEGATIVE /UL
SP GR UR: 1.02 (ref 1–1.03)
UROBILINOGEN UR QL: NORMAL

## 2023-01-03 PROCEDURE — 76872 US TRANSRECTAL: CPT | Performed by: STUDENT IN AN ORGANIZED HEALTH CARE EDUCATION/TRAINING PROGRAM

## 2023-01-03 PROCEDURE — 99213 OFFICE O/P EST LOW 20 MIN: CPT | Performed by: STUDENT IN AN ORGANIZED HEALTH CARE EDUCATION/TRAINING PROGRAM

## 2023-01-03 PROCEDURE — 81003 URINALYSIS AUTO W/O SCOPE: CPT | Performed by: STUDENT IN AN ORGANIZED HEALTH CARE EDUCATION/TRAINING PROGRAM

## 2023-01-03 PROCEDURE — 52000 CYSTOURETHROSCOPY: CPT | Performed by: STUDENT IN AN ORGANIZED HEALTH CARE EDUCATION/TRAINING PROGRAM

## 2023-01-03 NOTE — TELEPHONE ENCOUNTER
Yudy Levin with  Urology is requesting clearance for the patient to have a transurethral resection of his prostate. She would like to know how long he will need to hold Aspirin and Plavix prior to?          (P)[phone]

## 2023-01-03 NOTE — PROGRESS NOTES
Preprocedure diagnosis  BPH with LUTS     Postprocedure diagnosis  BPH with LUTS     Procedure  Flexible Cystourethroscopy    Attending surgeon  Kelby Crum MD    Anesthesia  2% lidocaine jelly intraurethrally    Complications  None    Indications  67 y.o. male undergoing a flexible cystoscopy for the above mentioned indications.  Informed consent was obtained.  Normal PSA trend. Refractory BPH symptoms and incomplete bladder emptying. Presents for cysto and TRUS/Uroflow/PVR.          Findings  The urethral urothelium was within normal limits with no visible strictures.      The prostatic urethra revealed significant lateral lobe hyperplasia coaptation, with moderate intravesical median lobe.     Bladder findings included bilateral ureters in orthotopic position, normal bladder mucosa without tumors, lesions, or stones.     Procedure  The patient was placed in supine position and prepped and draped in sterile fashion with lidocaine jelly instill per the urethra for anesthesia 5 minutes prior to procedure start.  A brief timeout was performed including available nursing staff and the patient.      The 16 Fr digital flexible cystoscope was lubricated and gently advanced into the urethral meatus.     The penile and bulbar urethra appeared widely patent without visible lesion or stricture.     The prostatic urethra was notable for significant lateral lobe hyperplasia with coaptation, with moderate intravesical median lobe component.      The scope was then advanced into the bladder. The bladder was completely visualized starting with the trigone.     There were bilateral orthotopic ureteral orifices.     The posterior wall, lateral walls, anterior wall, and dome were completely visualized WITHOUT obvious mucosal lesions, tumors, stones.    He has moderate trabeculations indicative of chronic bladder outlet obstruction.      The cystoscope was retroflexed and the bladder neck and prostate were further visualized  and appeared normal.      The cystoscope was then gently withdrawn while visualizing the urethra and the procedure was then terminated.  The patient tolerated the procedure well.     Uroflow     Avg flow: 2.8  ml/sec  Max flow: 11.9 ml/sec  Time to max flow: 5.3 sec  Voided volume: 193.5 mL     PVR: 50 mL       ===============================     Preprocedure diagnosis  BPH with LUTS    Postprocedure diagnosis  BPH with LUTS    Procedure  Transrectal ultrasound-guided prostate sizing     Attending surgeon  Kelby Crum MD    Anesthesia  None    Complications  None    Indications  67 y.o. male who has a history of BPH with LUTS presents for prostate sizing for possible surgical planning.     Findings  -Digital rectal examination = Benign, smooth  -Prostate volume measurements = 28 cc volume     No results found for: PSA      Procedure   The patient was positioned and prepped in a left lateral position with lower extremities flexed.       A digital rectal exam was performed identifying a smooth prostate with no induration.     The rectal ultrasound probe was slowly introduced into the rectum without difficulty.      The prostate and seminal vesicles were inspected systematically using axial and sagittal views with the ultrasound.      The dimensions of the prostate were measured, for a calculated volume of 28 mL, PSA Density non concerning.      The rectal ultrasound probe was removed.      The patient tolerated the procedure well.          Follow Up:   The patient was counseled regarding options regarding his continued BPH with lower urinary tract symptoms.  The patient is on maximal medical therapy with tamsulosin and finasteride.  He has a normal PSA trend.    Based on cystoscopy, uroflow, TRUS prostate sizing, the patient is a candidate for surgical options, given findings of significant lateral lobe hyperplasia, intravesical median lobe.     Surgical options include transurethral resection of prostate  (downsides to TURP include likely need for overnight admission, higher bleeding risk), greenlight photo vaporization of prostate (will include the inevitability of ejaculatory dysfunction and retrograde ejaculation in men who are sexually active but may portend better long-term results), or minimally invasive UroLift (preserved antegrade ejaculation, may afford long-term symptom relief with the ability to discontinue medications, however may have symptom recurrence past 5 to 10 years, benefit of UroLift is that this does not preclude additional more aggressive surgeries in the future if necessary).     Given the patient's presence of an intravesical median lobe I discussed that he would be less likely a candidate for UroLift and I would recommend either TURP or greenlight laser ablation of the prostate.  The patient is currently on Plavix and this is likely going to be discontinued within the next month.  We discussed he would have to be off this for at least 1 week and 1 week after the procedure to prevent urinary bleeding complications.  We discussed postoperative expectations including catheter in for 2 to 3 days with removal my clinic.  We discussed the possibility of stress urinary incontinence, bladder neck contracture, urethral stricture, retrograde ejaculation which is inevitable, anesthetic related complications including MI, CVA, PE, DVT, death.     Patient was given educational materials regarding each option.    He would like to proceed with cystoscopy and greenlight photo vaporization of the prostate February 20, 2022.    We will receive clarification from his cardiologist whether the patient will require any clearance prior and he will have to be off Plavix by the time of surgery.      Kelby Crum MD

## 2023-01-04 LAB
BILIRUB UR QL STRIP: NEGATIVE
CLARITY UR: CLEAR
COLOR UR: YELLOW
GLUCOSE UR STRIP-MCNC: NEGATIVE MG/DL
HGB UR QL STRIP.AUTO: NEGATIVE
KETONES UR QL STRIP: NEGATIVE
LEUKOCYTE ESTERASE UR QL STRIP.AUTO: NEGATIVE
NITRITE UR QL STRIP: NEGATIVE
PH UR STRIP.AUTO: 6 [PH] (ref 5–8)
PROT UR QL STRIP: NEGATIVE
SP GR UR STRIP: 1.01 (ref 1–1.03)
UROBILINOGEN UR QL STRIP: NORMAL

## 2023-01-10 ENCOUNTER — HOSPITAL ENCOUNTER (OUTPATIENT)
Dept: ULTRASOUND IMAGING | Facility: HOSPITAL | Age: 68
Discharge: HOME | End: 2023-01-10
Admitting: NURSE PRACTITIONER
Payer: MEDICARE

## 2023-01-10 DIAGNOSIS — N20.0 RENAL STONES: ICD-10-CM

## 2023-01-10 DIAGNOSIS — N28.1 RENAL CYST: ICD-10-CM

## 2023-01-10 PROCEDURE — 76775 US EXAM ABDO BACK WALL LIM: CPT

## 2023-01-24 ENCOUNTER — TELEPHONE (OUTPATIENT)
Dept: UROLOGY | Facility: CLINIC | Age: 68
End: 2023-01-24

## 2023-01-24 NOTE — TELEPHONE ENCOUNTER
Caller: ALAN LOZANO    Relationship to patient: [employer]SE     Best call back number: [phone]    Patient is needing: PTS WIFE CALLED IN TO CHECK THE STATUS OF GETTING CLEARANCE FROM THE CARDIOLOGIST TO HAVE PROCEDURE DONE WITH DR SALTER ON 2-20-23. THE PROCEDURE IS CYSTO TRANSURETHRAL RESECTION OF PROSTATE GREENLIGHT. THEY ARE WANTING TO KNOW IF THE PT IS CLEARED TO HAVE THE PROCEDURE. PLEASE GIVE PTS WIFE A CALL BACK TO DISCUSS.

## 2023-02-09 NOTE — PROGRESS NOTES
Mode of visit: Telephone  Location of patient: Home   You have chosen to receive care through the use of telemedicine. Telemedicine enables health care providers at different locations to provide safe, effective, and convenient care through the use of technology. As with any health care service, there are risks associated with the use of telemedicine, including equipment failure, poor connections, and  issues.  • Do you understand the risks and benefits of telemedicine as I have explained them to you? Yes  • Have your questions regarding telemedicine been answered? Yes  • Do you consent to the use of telemedicine in your medical care today? Yes      Chief Complaint  Watchman device follow-up    [facility]  Telemedicine note    This was a video enabled telemedicine encounter.    Subjective    History of Present Illness {CC  Problem List  Visit  Diagnosis   Encounters  Notes  Medications  Labs  Result Review Imaging  Media :23}     Justus Gaitan, 67 y.o. male with paroxysmal AF s/p Watchman implant, CAD, GIB presents to [facility] telemedicine visit for Watchman device follow-up.    6 Month Watchman Follow-up Note    Contacted patient for 6-month follow-up s/p watchman placement by Dr. Garcia on 8/9/2022.  Patient has completed follow-up MANDIE and follow-up appointment with Dr. Garcia and transition to DAPT.  Patient had recent f/u with Dr. Cesar and losartan was discontinued. Doing well overall, and feeling better off of blood thinner and losartan.     Discussed 1 year watchman follow-up with planned MANDIE around 1 year anniversary date of watchman implant.  1 year MANDIE ordered per watchman protocol.  Patient receptive of plan, appreciative of call.      Objective     Vital Signs:   Vitals:    02/07/23 1235   BP: 122/69   Pulse: 63     There is no height or weight on file to calculate BMI.  Physical Exam  Vitals reviewed.    Constitutional:       Appearance: Normal appearance.   HENT:      Head: Normocephalic.   Pulmonary:      Effort: Pulmonary effort is normal. No respiratory distress.   Neurological:      Mental Status: He is alert and oriented to person, place, and time.   Psychiatric:         Mood and Affect: Mood normal.         Behavior: Behavior normal.         Thought Content: Thought content normal.       Data Reviewed:{ Labs  Result Review  Imaging  Med Tab  Media :23}     Adult Transesophageal Echo (MANDIE) W/ Cont if Necessary Per Protocol (09/28/2022 10:59)  EP/CRM Study (08/09/2022 09:19)  Progress Notes by Shaun Garcia DO (11/14/2022 11:00)      Assessment and Plan {CC Problem List  Visit Diagnosis  ROS  Review (Popup)  Health Maintenance  Quality  BestPractice  Medications  SmartSets  SnapShot Encounters  Media :23}     Unable to complete visit using a video connection to the patient. A phone visit was used to complete this visits. Total time of discussion was 14 minutes.    1. Presence of Watchman left atrial appendage closure device  -s/p watchman placement by Dr. Garcia on 8/9/2022.    Patient has completed follow-up MANDIE with no significant ester-device flow and follow-up appointment with Dr. Garcia.  -Discontinue clopidogrel  -Continue ASA 81mg daily  -1y MANDIE for Watchman imaging  - Adult Transesophageal Echo (MANDIE) W/ Cont if Necessary Per Protocol; Future    2. Paroxysmal atrial fibrillation (HCC)  -s/p Watchman device.  -[id number]: 3  -Continue ASA 81mg daily  -Continue metoprolol  -Continue f/u with EP as scheduled, and Dr. Morrell      Follow Up {Instructions Charge Capture  Follow-up Communications :23}   Return if symptoms worsen or fail to improve.      Patient was given instructions and counseling regarding his condition or for health maintenance advice. Please see specific information pulled into the AVS if appropriate.  Patient was instructed to call the [facility] with any  questions, concerns, or worsening symptoms.    *Please note that portions of this note were completed with a voice recognition program. Efforts were made to edit the dictations, but occasionally words are mistranscribed.

## 2023-02-10 ENCOUNTER — TELEMEDICINE (OUTPATIENT)
Dept: CARDIOLOGY | Facility: HOSPITAL | Age: 68
End: 2023-02-10
Payer: MEDICARE

## 2023-02-10 VITALS — DIASTOLIC BLOOD PRESSURE: 69 MMHG | SYSTOLIC BLOOD PRESSURE: 122 MMHG | HEART RATE: 63 BPM

## 2023-02-10 DIAGNOSIS — Z95.818 PRESENCE OF WATCHMAN LEFT ATRIAL APPENDAGE CLOSURE DEVICE: Primary | ICD-10-CM

## 2023-02-10 DIAGNOSIS — I48.0 PAROXYSMAL ATRIAL FIBRILLATION: ICD-10-CM

## 2023-02-10 PROCEDURE — 99442 PR PHYS/QHP TELEPHONE EVALUATION 11-20 MIN: CPT | Performed by: NURSE PRACTITIONER

## 2023-02-13 ENCOUNTER — PRE-ADMISSION TESTING (OUTPATIENT)
Dept: PREADMISSION TESTING | Facility: HOSPITAL | Age: 68
End: 2023-02-13
Payer: MEDICARE

## 2023-02-13 VITALS — WEIGHT: 210.1 LBS | BODY MASS INDEX: 29.41 KG/M2 | HEIGHT: 71 IN

## 2023-02-13 DIAGNOSIS — R35.0 BENIGN PROSTATIC HYPERPLASIA WITH URINARY FREQUENCY: ICD-10-CM

## 2023-02-13 DIAGNOSIS — N40.1 BENIGN PROSTATIC HYPERPLASIA WITH URINARY FREQUENCY: ICD-10-CM

## 2023-02-13 LAB
ANION GAP SERPL CALCULATED.3IONS-SCNC: 8 MMOL/L (ref 5–15)
BUN SERPL-MCNC: 17 MG/DL (ref 8–23)
BUN/CREAT SERPL: 18.1 (ref 7–25)
CALCIUM SPEC-SCNC: 9.9 MG/DL (ref 8.6–10.5)
CHLORIDE SERPL-SCNC: 105 MMOL/L (ref 98–107)
CO2 SERPL-SCNC: 29 MMOL/L (ref 22–29)
CREAT SERPL-MCNC: 0.94 MG/DL (ref 0.76–1.27)
DEPRECATED RDW RBC AUTO: 45.6 FL (ref 37–54)
EGFRCR SERPLBLD CKD-EPI 2021: 89.4 ML/MIN/1.73
ERYTHROCYTE [DISTWIDTH] IN BLOOD BY AUTOMATED COUNT: 13 % (ref 12.3–15.4)
GLUCOSE SERPL-MCNC: 103 MG/DL (ref 65–99)
HBA1C MFR BLD: 5.4 % (ref 4.8–5.6)
HCT VFR BLD AUTO: 46.4 % (ref 37.5–51)
HGB BLD-MCNC: 15.7 G/DL (ref 13–17.7)
INR PPP: 1.01 (ref 0.84–1.13)
MCH RBC QN AUTO: 32.5 PG (ref 26.6–33)
MCHC RBC AUTO-ENTMCNC: 33.8 G/DL (ref 31.5–35.7)
MCV RBC AUTO: 96.1 FL (ref 79–97)
PLATELET # BLD AUTO: 193 10*3/MM3 (ref 140–450)
PMV BLD AUTO: 10.6 FL (ref 6–12)
POTASSIUM SERPL-SCNC: 4.9 MMOL/L (ref 3.5–5.2)
PROTHROMBIN TIME: 13.2 SECONDS (ref 11.4–14.4)
RBC # BLD AUTO: 4.83 10*6/MM3 (ref 4.14–5.8)
SODIUM SERPL-SCNC: 142 MMOL/L (ref 136–145)
WBC NRBC COR # BLD: 7.41 10*3/MM3 (ref 3.4–10.8)

## 2023-02-13 PROCEDURE — 80048 BASIC METABOLIC PNL TOTAL CA: CPT

## 2023-02-13 PROCEDURE — 85610 PROTHROMBIN TIME: CPT

## 2023-02-13 PROCEDURE — 85027 COMPLETE CBC AUTOMATED: CPT

## 2023-02-13 PROCEDURE — 36415 COLL VENOUS BLD VENIPUNCTURE: CPT

## 2023-02-13 PROCEDURE — 83036 HEMOGLOBIN GLYCOSYLATED A1C: CPT

## 2023-02-13 NOTE — PAT
ekg on chart from 23 with clearance on chart.     Patient viewed general PAT education video as instructed in their preoperative information received from their surgeon.  Patient stated the general PAT education video was viewed in its entirety and survey completed.  Copies of PAT general education handouts (Incentive Spirometry, Meds to Beds Program, Patient Belongings, Pre-op skin preparation instructions, Blood Glucose testing, Visitor policy, Surgery FAQ, Code H) distributed to patient if not printed. Education related to the PAT pass and skin preparation for surgery (if applicable) completed in PAT as a reinforcement to PAT education video. Patient instructed to return PAT pass provided today as well as completed skin preparation sheet (if applicable) on the day of procedure.     Additionally if patient had not viewed video yet but intended to view it at home or in our waiting area, then referred them to the handout with QR code/link provided during PAT visit.  Instructed patient to complete survey after viewing the video in its entirety.  Encouraged patient/family to read PAT general education handouts thoroughly and notify PAT staff with any questions or concerns. Patient verbalized understanding of all information and priority content.    Patient instructed to drink 20 ounces of Gatorade and it needs to be completed 1 hour (for Main OR patients) or 2 hours (scheduled  section & [facility] patients) before given arrival time for procedure (NO RED Gatorade)    Patient verbalized understanding.

## 2023-02-19 ENCOUNTER — ANESTHESIA EVENT (OUTPATIENT)
Dept: PERIOP | Facility: HOSPITAL | Age: 68
End: 2023-02-19
Payer: MEDICARE

## 2023-02-19 RX ORDER — FAMOTIDINE 10 MG/ML
20 INJECTION, SOLUTION INTRAVENOUS ONCE
Status: CANCELLED | OUTPATIENT
Start: 2023-02-19 | End: 2023-02-19

## 2023-02-20 ENCOUNTER — ANESTHESIA (OUTPATIENT)
Dept: PERIOP | Facility: HOSPITAL | Age: 68
End: 2023-02-20
Payer: MEDICARE

## 2023-02-20 ENCOUNTER — HOSPITAL ENCOUNTER (OUTPATIENT)
Facility: HOSPITAL | Age: 68
Setting detail: HOSPITAL OUTPATIENT SURGERY
Discharge: HOME | End: 2023-02-20
Attending: STUDENT IN AN ORGANIZED HEALTH CARE EDUCATION/TRAINING PROGRAM | Admitting: STUDENT IN AN ORGANIZED HEALTH CARE EDUCATION/TRAINING PROGRAM
Payer: MEDICARE

## 2023-02-20 VITALS
SYSTOLIC BLOOD PRESSURE: 133 MMHG | DIASTOLIC BLOOD PRESSURE: 83 MMHG | TEMPERATURE: 97.6 F | OXYGEN SATURATION: 96 % | RESPIRATION RATE: 16 BRPM | HEART RATE: 93 BPM

## 2023-02-20 DIAGNOSIS — R35.0 BENIGN PROSTATIC HYPERPLASIA WITH URINARY FREQUENCY: ICD-10-CM

## 2023-02-20 DIAGNOSIS — N40.1 BENIGN PROSTATIC HYPERPLASIA WITH URINARY FREQUENCY: ICD-10-CM

## 2023-02-20 PROCEDURE — 25010000002 DEXAMETHASONE PER 1 MG: Performed by: NURSE ANESTHETIST, CERTIFIED REGISTERED

## 2023-02-20 PROCEDURE — 25010000002 FENTANYL CITRATE (PF) 100 MCG/2ML SOLUTION: Performed by: NURSE ANESTHETIST, CERTIFIED REGISTERED

## 2023-02-20 PROCEDURE — 25010000002 PROPOFOL 10 MG/ML EMULSION: Performed by: NURSE ANESTHETIST, CERTIFIED REGISTERED

## 2023-02-20 PROCEDURE — 52648 LASER SURGERY OF PROSTATE: CPT | Performed by: STUDENT IN AN ORGANIZED HEALTH CARE EDUCATION/TRAINING PROGRAM

## 2023-02-20 PROCEDURE — 25010000002 CEFAZOLIN IN DEXTROSE 2-4 GM/100ML-% SOLUTION: Performed by: STUDENT IN AN ORGANIZED HEALTH CARE EDUCATION/TRAINING PROGRAM

## 2023-02-20 PROCEDURE — 25010000002 ONDANSETRON PER 1 MG: Performed by: NURSE ANESTHETIST, CERTIFIED REGISTERED

## 2023-02-20 RX ORDER — MIDAZOLAM HYDROCHLORIDE 1 MG/ML
0.5 INJECTION INTRAMUSCULAR; INTRAVENOUS
Status: DISCONTINUED | OUTPATIENT
Start: 2023-02-20 | End: 2023-02-20 | Stop reason: HOSPADM

## 2023-02-20 RX ORDER — FENTANYL CITRATE 50 UG/ML
50 INJECTION, SOLUTION INTRAMUSCULAR; INTRAVENOUS
Status: DISCONTINUED | OUTPATIENT
Start: 2023-02-20 | End: 2023-02-20 | Stop reason: HOSPADM

## 2023-02-20 RX ORDER — SODIUM CHLORIDE, SODIUM LACTATE, POTASSIUM CHLORIDE, CALCIUM CHLORIDE 600; 310; 30; 20 MG/100ML; MG/100ML; MG/100ML; MG/100ML
9 INJECTION, SOLUTION INTRAVENOUS CONTINUOUS
Status: DISCONTINUED | OUTPATIENT
Start: 2023-02-20 | End: 2023-02-20 | Stop reason: HOSPADM

## 2023-02-20 RX ORDER — HYOSCYAMINE SULFATE 0.12 MG/1
0.12 TABLET SUBLINGUAL EVERY 4 HOURS PRN
Qty: 30 EACH | Refills: 1 | Status: SHIPPED | OUTPATIENT
Start: 2023-02-20 | End: 2023-02-22

## 2023-02-20 RX ORDER — PHENYLEPHRINE HCL IN 0.9% NACL 1 MG/10 ML
SYRINGE (ML) INTRAVENOUS AS NEEDED
Status: DISCONTINUED | OUTPATIENT
Start: 2023-02-20 | End: 2023-02-20 | Stop reason: SURG

## 2023-02-20 RX ORDER — GLYCOPYRROLATE 0.2 MG/ML
INJECTION INTRAMUSCULAR; INTRAVENOUS AS NEEDED
Status: DISCONTINUED | OUTPATIENT
Start: 2023-02-20 | End: 2023-02-20 | Stop reason: SURG

## 2023-02-20 RX ORDER — MAGNESIUM HYDROXIDE 1200 MG/15ML
LIQUID ORAL AS NEEDED
Status: DISCONTINUED | OUTPATIENT
Start: 2023-02-20 | End: 2023-02-20 | Stop reason: HOSPADM

## 2023-02-20 RX ORDER — SODIUM CHLORIDE 9 MG/ML
40 INJECTION, SOLUTION INTRAVENOUS AS NEEDED
Status: DISCONTINUED | OUTPATIENT
Start: 2023-02-20 | End: 2023-02-20 | Stop reason: HOSPADM

## 2023-02-20 RX ORDER — EPHEDRINE SULFATE 50 MG/ML
INJECTION INTRAVENOUS AS NEEDED
Status: DISCONTINUED | OUTPATIENT
Start: 2023-02-20 | End: 2023-02-20 | Stop reason: SURG

## 2023-02-20 RX ORDER — FENTANYL CITRATE 50 UG/ML
INJECTION, SOLUTION INTRAMUSCULAR; INTRAVENOUS AS NEEDED
Status: DISCONTINUED | OUTPATIENT
Start: 2023-02-20 | End: 2023-02-20 | Stop reason: SURG

## 2023-02-20 RX ORDER — DEXAMETHASONE SODIUM PHOSPHATE 4 MG/ML
INJECTION, SOLUTION INTRA-ARTICULAR; INTRALESIONAL; INTRAMUSCULAR; INTRAVENOUS; SOFT TISSUE AS NEEDED
Status: DISCONTINUED | OUTPATIENT
Start: 2023-02-20 | End: 2023-02-20 | Stop reason: SURG

## 2023-02-20 RX ORDER — BACITRACIN ZINC 500 [USP'U]/G
OINTMENT TOPICAL
Qty: 30 G | Refills: 0 | Status: SHIPPED | OUTPATIENT
Start: 2023-02-20 | End: 2023-02-22

## 2023-02-20 RX ORDER — ROCURONIUM BROMIDE 10 MG/ML
INJECTION, SOLUTION INTRAVENOUS AS NEEDED
Status: DISCONTINUED | OUTPATIENT
Start: 2023-02-20 | End: 2023-02-20 | Stop reason: SURG

## 2023-02-20 RX ORDER — CEFAZOLIN SODIUM 2 G/100ML
2 INJECTION, SOLUTION INTRAVENOUS ONCE
Status: COMPLETED | OUTPATIENT
Start: 2023-02-20 | End: 2023-02-20

## 2023-02-20 RX ORDER — SODIUM CHLORIDE 0.9 % (FLUSH) 0.9 %
10 SYRINGE (ML) INJECTION AS NEEDED
Status: DISCONTINUED | OUTPATIENT
Start: 2023-02-20 | End: 2023-02-20 | Stop reason: HOSPADM

## 2023-02-20 RX ORDER — OXYBUTYNIN CHLORIDE 10 MG/1
10 TABLET, EXTENDED RELEASE ORAL DAILY
Qty: 30 TABLET | Refills: 0 | Status: SHIPPED | OUTPATIENT
Start: 2023-02-20 | End: 2023-03-22

## 2023-02-20 RX ORDER — SODIUM CHLORIDE 0.9 % (FLUSH) 0.9 %
10 SYRINGE (ML) INJECTION EVERY 12 HOURS SCHEDULED
Status: DISCONTINUED | OUTPATIENT
Start: 2023-02-20 | End: 2023-02-20 | Stop reason: HOSPADM

## 2023-02-20 RX ORDER — NITROFURANTOIN 25; 75 MG/1; MG/1
100 CAPSULE ORAL 2 TIMES DAILY
Qty: 10 CAPSULE | Refills: 0 | Status: SHIPPED | OUTPATIENT
Start: 2023-02-20 | End: 2023-02-22

## 2023-02-20 RX ORDER — FAMOTIDINE 20 MG/1
20 TABLET, FILM COATED ORAL ONCE
Status: COMPLETED | OUTPATIENT
Start: 2023-02-20 | End: 2023-02-20

## 2023-02-20 RX ORDER — HYDROCODONE BITARTRATE AND ACETAMINOPHEN 5; 325 MG/1; MG/1
1 TABLET ORAL EVERY 6 HOURS PRN
Qty: 12 TABLET | Refills: 0 | Status: SHIPPED | OUTPATIENT
Start: 2023-02-20 | End: 2023-02-22

## 2023-02-20 RX ORDER — HYDROCODONE BITARTRATE AND ACETAMINOPHEN 5; 325 MG/1; MG/1
TABLET ORAL
Status: COMPLETED
Start: 2023-02-20 | End: 2023-02-20

## 2023-02-20 RX ORDER — LIDOCAINE HYDROCHLORIDE 10 MG/ML
0.5 INJECTION, SOLUTION EPIDURAL; INFILTRATION; INTRACAUDAL; PERINEURAL ONCE AS NEEDED
Status: COMPLETED | OUTPATIENT
Start: 2023-02-20 | End: 2023-02-20

## 2023-02-20 RX ORDER — PROPOFOL 10 MG/ML
VIAL (ML) INTRAVENOUS AS NEEDED
Status: DISCONTINUED | OUTPATIENT
Start: 2023-02-20 | End: 2023-02-20 | Stop reason: SURG

## 2023-02-20 RX ORDER — ONDANSETRON 2 MG/ML
INJECTION INTRAMUSCULAR; INTRAVENOUS AS NEEDED
Status: DISCONTINUED | OUTPATIENT
Start: 2023-02-20 | End: 2023-02-20 | Stop reason: SURG

## 2023-02-20 RX ORDER — ONDANSETRON 2 MG/ML
4 INJECTION INTRAMUSCULAR; INTRAVENOUS ONCE AS NEEDED
Status: DISCONTINUED | OUTPATIENT
Start: 2023-02-20 | End: 2023-02-20 | Stop reason: HOSPADM

## 2023-02-20 RX ORDER — LIDOCAINE HYDROCHLORIDE 10 MG/ML
INJECTION, SOLUTION EPIDURAL; INFILTRATION; INTRACAUDAL; PERINEURAL AS NEEDED
Status: DISCONTINUED | OUTPATIENT
Start: 2023-02-20 | End: 2023-02-20 | Stop reason: SURG

## 2023-02-20 RX ADMIN — FAMOTIDINE 20 MG: 20 TABLET, FILM COATED ORAL at 07:40

## 2023-02-20 RX ADMIN — GLYCOPYRROLATE 0.2 MG: 0.2 INJECTION INTRAMUSCULAR; INTRAVENOUS at 09:30

## 2023-02-20 RX ADMIN — LIDOCAINE HYDROCHLORIDE 0.2 ML: 10 INJECTION, SOLUTION EPIDURAL; INFILTRATION; INTRACAUDAL; PERINEURAL at 07:22

## 2023-02-20 RX ADMIN — EPHEDRINE SULFATE 10 MG: 50 INJECTION INTRAVENOUS at 09:34

## 2023-02-20 RX ADMIN — FENTANYL CITRATE 100 MCG: 50 INJECTION, SOLUTION INTRAMUSCULAR; INTRAVENOUS at 08:59

## 2023-02-20 RX ADMIN — Medication 200 MCG: at 09:07

## 2023-02-20 RX ADMIN — PROPOFOL 200 MG: 10 INJECTION, EMULSION INTRAVENOUS at 08:59

## 2023-02-20 RX ADMIN — DEXAMETHASONE SODIUM PHOSPHATE 8 MG: 4 INJECTION, SOLUTION INTRAMUSCULAR; INTRAVENOUS at 09:02

## 2023-02-20 RX ADMIN — HYDROCODONE BITARTRATE AND ACETAMINOPHEN 1 TABLET: 5; 325 TABLET ORAL at 10:20

## 2023-02-20 RX ADMIN — Medication 200 MCG: at 09:10

## 2023-02-20 RX ADMIN — GLYCOPYRROLATE 0.2 MG: 0.2 INJECTION INTRAMUSCULAR; INTRAVENOUS at 09:32

## 2023-02-20 RX ADMIN — SODIUM CHLORIDE, POTASSIUM CHLORIDE, SODIUM LACTATE AND CALCIUM CHLORIDE 9 ML/HR: 600; 310; 30; 20 INJECTION, SOLUTION INTRAVENOUS at 07:22

## 2023-02-20 RX ADMIN — CEFAZOLIN SODIUM 2 G: 2 INJECTION, SOLUTION INTRAVENOUS at 08:57

## 2023-02-20 RX ADMIN — ONDANSETRON 4 MG: 2 INJECTION INTRAMUSCULAR; INTRAVENOUS at 09:35

## 2023-02-20 RX ADMIN — LIDOCAINE HYDROCHLORIDE 50 MG: 10 INJECTION, SOLUTION EPIDURAL; INFILTRATION; INTRACAUDAL; PERINEURAL at 08:59

## 2023-02-20 RX ADMIN — ROCURONIUM BROMIDE 50 MG: 10 INJECTION INTRAVENOUS at 08:59

## 2023-02-20 RX ADMIN — SUGAMMADEX 200 MG: 100 INJECTION, SOLUTION INTRAVENOUS at 09:40

## 2023-02-20 NOTE — OP NOTE
CYSTOSCOPY TRANSURETHRAL RESECTION OF PROSTATE GREENLIGHT  Procedure Report    Patient Name:  Justus Gaitan  YOB: 1955    Date of Surgery:  2/20/2023     Indications: 67-year-old male with refractory BPH and lower urinary tract symptoms who presents for greenlight PVP.    Pre-op Diagnosis:   Benign prostatic hyperplasia with urinary frequency [N40.1, R35.0]       Post-Op Diagnosis Codes:     * Benign prostatic hyperplasia with urinary frequency [N40.1, R35.0]      Procedure(s):  CYSTOSCOPY TRANSURETHRAL RESECTION OF PROSTATE GREENLIGHT    Staff:  Surgeon(s):  Kelby Crum MD         Anesthesia: General    Estimated Blood Loss: minimal    Implants:    Nothing was implanted during the procedure    Specimen:          None        Findings: Trilobar hyperplasia with intravesical median lobe, uncomplicated greenlight PVP, widely patent prostate fossa and bladder neck.  20 Irish three-way Carias catheter, no CBI.    Complications: None    Description of Procedure:   After informed consent was obtained, the patient was taken to the operating room and placed supine on the operating table and general anesthesia was induced. He was repositioned to the dorsal lithotomy position and prepped and draped in a standard sterile fashion.     Preoperative antibiotics were given and a multidisciplinary time-out was taken. Using [place] sounds, the urethra was dilated to 30-Irish.     Next, a 26-Irish continuous flow resectoscope with a visual obturator was advanced through the urethra and into the bladder. The prostatic urethra was notable for significant lateral lobe hyperplasia with coaptation and intravesical median lobe at the midline.     Panendoscopy revealed no bladder abnormalities and both ureteral orifices were identified.      The visual obturator was removed and replaced with a laser bridge. A Greenlight laser fiber was advanced through the working channel of the scope, and two vaporization  incisions were made at the 5 and 7 o'clock positions t the bladder neck, and then taken down to just proximal to the verumontanum, sparing the urethral sphincter.    The median lobe was completely ablated/vaporized to the prostatic capsule avoiding undermining the bladder neck.     Next, the right lateral lobe was vaporized to the prostatic capsule, followed by the left. Bleeding vessels and venous channels encountered were controlled with coagulation settings on the laser.     There was no significant prostatic specimen available to send to pathology after completion of vaporization. The bladder was emptied. 20 cc of lidocaine gel was instilled into the urethra for analgesia. A 20 Fr 3-way sim catheter was then inserted into the bladder without difficulty and 30 ml of sterile water was used to fill the balloon. Temporary CBI was initiated with plans to d/c CBI in post op if hemostasis is confirmed well controlled. The catheter was secured to the patient's thigh.     The patient was brought to PACU in stable condition. The patient tolerated the procedure well and there were no immediate complications.      Kelby Crum MD     Date: 2/20/2023  Time: 09:46 EST

## 2023-02-20 NOTE — ANESTHESIA POSTPROCEDURE EVALUATION
Patient: Justus Gaitan    Procedure Summary     Date: 02/20/23 Room / Location:  [facility] /  [facility]    Anesthesia Start: 0854 Anesthesia Stop: 0949    Procedure: CYSTOSCOPY TRANSURETHRAL RESECTION OF PROSTATE GREENLIGHT Diagnosis:       Benign prostatic hyperplasia with urinary frequency      (Benign prostatic hyperplasia with urinary frequency [N40.1, R35.0])    Surgeons: Kelby Crum MD Provider: Isac Payne MD    Anesthesia Type: general ASA Status: 3          Anesthesia Type: general    Vitals  Vitals Value Taken Time   BP     Temp     Pulse 85 02/20/23 0949   Resp     SpO2 96 % 02/20/23 0949   Vitals shown include unvalidated device data.        Post Anesthesia Care and Evaluation    Patient location during evaluation: PACU  Patient participation: complete - patient participated  Level of consciousness: awake and alert  Pain management: adequate    Airway patency: patent  Anesthetic complications: No anesthetic complications  PONV Status: none  Cardiovascular status: hemodynamically stable and acceptable  Respiratory status: nonlabored ventilation, acceptable and nasal cannula  Hydration status: acceptable

## 2023-02-20 NOTE — ANESTHESIA PREPROCEDURE EVALUATION
Anesthesia Evaluation     Patient summary reviewed and Nursing notes reviewed                Airway   Mallampati: III  TM distance: >3 FB  Neck ROM: full  Small opening and Possible difficult intubation  Dental - normal exam     Pulmonary - negative pulmonary ROS and normal exam   Cardiovascular - normal exam    (+) hypertension, CAD, cardiac stents (RCA 3/17) dysrhythmias Paroxysmal Atrial Fib, hyperlipidemia,     ROS comment:   Echo 9/22: normal EF, mild MS, YUSUF occlusion device seated well without flow    LHC 3/22: normal EF, no critical obstructive CAD    Neuro/Psych- negative ROS  GI/Hepatic/Renal/Endo    (+)  GERD,  renal disease CRI,     Musculoskeletal (-) negative ROS    Abdominal  - normal exam    Bowel sounds: normal.   Substance History - negative use     OB/GYN negative ob/gyn ROS         Other                      Anesthesia Plan    ASA 3     general     (chan available)  intravenous induction     Anesthetic plan, risks, benefits, and alternatives have been provided, discussed and informed consent has been obtained with: patient.    Plan discussed with CRNA.        CODE STATUS:

## 2023-02-20 NOTE — ANESTHESIA PROCEDURE NOTES
Airway  Urgency: elective    Date/Time: 2/20/2023 9:01 AM  Airway not difficult    General Information and Staff    Patient location during procedure: OR  CRNA/CAA: Isac Roberson CRNA    Indications and Patient Condition  Indications for airway management: airway protection    Preoxygenated: yes  MILS not maintained throughout  Mask difficulty assessment: 1 - vent by mask    Final Airway Details  Final airway type: endotracheal airway      Successful airway: ETT  Cuffed: yes   Successful intubation technique: direct laryngoscopy  Facilitating devices/methods: intubating stylet  Endotracheal tube insertion site: oral  Blade: Hathaway  Blade size: 2  ETT size (mm): 7.0  Cormack-Lehane Classification: grade IIa - partial view of glottis  Placement verified by: chest auscultation and capnometry   Cuff volume (mL): 8  Measured from: lips  ETT/EBT  to lips (cm): 20  Number of attempts at approach: 1  Assessment: lips, teeth, and gum same as pre-op and atraumatic intubation    Additional Comments  Negative epigastric sounds, Breath sound equal bilaterally with symmetric chest rise and fall

## 2023-02-20 NOTE — DISCHARGE INSTRUCTIONS
Laser Ablation of Prostate Post-Operative Care/Expectations     Please follow these guidelines after your procedure in order to assist with your recovery.     Anesthesia Precautions and Expectations  - Rest for 24 hours after receiving general anesthesia, make sure you have someone at home with you that can monitor you  - Do not operate a vehicle, drink alcohol, or make 'important decisions'/sign legal documentation during the immediate recovery period if you received sedation for your procedure  - You may experience a sore throat, jaw discomfort, or muscle aches related to anesthesia, these symptoms may last a few days    Activity  - Light activity is encouraged for 1 week to prevent urinary bleeding  - Avoid lifting heavy objects more than 20 lbs, running, or endurance exercise for 1 week   - Do not operate a vehicle or drink alcohol if you were prescribed narcotic pain medications     Bathing/Showering  - You may resume normal bathing and showering post-procedure    Pain/Urinary Symptoms  - You may experience burning urinary pain for a few days, and/or increased urinary urgency/frequency post-procedure for a few weeks which is expected  - A medication to treat burning urinary pain (Phenazopyridine) may be prescribed by your doctor, take as directed  - A medication to prevent urinary urgency/frequency (sometimes referred to as “bladder spasms”) (Hyoscyamine, Oxybutynin, Mirabegron, Solifenacin, etc.) may be prescribed by your doctor for up to 1 month, take as directed     Urinary Bleeding (Hematuria)   - Some degree of light urinary bleeding (hematuria) is expected for up to 1-2 weeks (this may be as light as pink lemonade or somewhat darker like clear/pale red Gatorade); a good rule of thumb is that your urine should remain see-through    - If you experience heavy urinary bleeding (like the color and consistency of tomato juice, or red wine), large blood clots, or you are unable to urinate for more than 8 hours  you should contact your doctor and present to the nearest Emergency Department  - Drink plenty of water at home and stay hydrated, as this will help naturally flush out your bladder and urethra    Sexual Activity  - Refrain from sexual activity and ejaculation for 1 week while you are healing which may help prevent pain and bleeding    Carias Catheter   - You may be sent home with a urethral catheter sometimes for up to 1 week post-procedure; catheter specific instructions are as follows:   - Do not attempt to remove your urinary catheter (unless explicitly instructed by your doctor with at home catheter removal instructions/equipment)  - If you feel that your catheter is not working the right way, call your doctor   - Keep your skin and catheter clean, clean the skin around your catheter at least two times each day, clean your skin  and catheter after every bowel movement  - Always keep your urine collection bag below the level of your bladder; keeping the bag below your bladder will help keep your urine from flowing back into your bladder from urine collection bag, which may cause infection     - Drink plenty of liquids, at least 6-8 glasses of healthy liquids or water each day which will help flush out your bladder  - Do not attempt sexual intercourse while you have a catheter in place  - Do not tug or pull on your catheter tubing. This can cause you to bleed and hurt your urethra. Do not step on the tubing when you walk. Always keep the catheter secured to your inner leg with either leg-tape provided, the special catheter sticker/securing device, or leg strap            When to Call Your Doctor  - Severe pain not controlled by oral medications  - Temperature above 101 F degrees  - Severe urinary bleeding or large blood clots in urine  - Inability to urinate for more than 8 hours post-surgery

## 2023-02-20 NOTE — H&P
Pre-Op H&P  Justus Gaitan  [id number]  1955      Chief complaint: Urinary frequency      Subjective:  Patient is a 67 y.o.male presents for scheduled surgery by Dr. Crum. He anticipates a CYSTOSCOPY TRANSURETHRAL RESECTION OF PROSTATE GREENLIGHT today. He has history of hematuria, renal calculi and prostate enlargement. He reports weak stream, frequency, retention and nocturia. He denies current gross hematuria or dysuria.       Review of Systems:  Constitutional-- No fever, chills or sweats. No fatigue.  CV-- No chest pain, palpitation or syncope. +HTN, HLD, afib  Resp-- No SOB, cough, hemoptysis  Skin--No rashes or lesions      Allergies:   Allergies   Allergen Reactions   • Sulfa Antibiotics Rash         Home Meds:  Medications Prior to Admission   Medication Sig Dispense Refill Last Dose   • APPLE CIDER VINEGAR PO Take 450 mg by mouth Daily.   2/19/2023   • atorvastatin (LIPITOR) 40 MG tablet Take 40 mg by mouth Every Night.   2/19/2023   • finasteride (PROSCAR) 5 MG tablet Take 5 mg by mouth Daily.   2/20/2023 at 0530   • metoprolol succinate XL (TOPROL-XL) 25 MG 24 hr tablet Take 25 mg by mouth Daily.   2/19/2023 at 2200   • Omega-3 Fatty Acids (fish oil) 1000 MG capsule capsule Take 2,000 mg by mouth Daily With Breakfast.   2/19/2023 at 2200   • pantoprazole (PROTONIX) 40 MG EC tablet Take 40 mg by mouth Daily.   2/20/2023 at 0530   • POTASSIUM CITRATE PO Take 1,080 mg by mouth 3 (Three) Times a Day. Patient takes 1 to 2 tablets daily   2/19/2023   • tamsulosin (FLOMAX) 0.4 MG capsule 24 hr capsule Take 1 capsule by mouth Daily.   2/20/2023 at 0530   • vitamin B-12 (CYANOCOBALAMIN) 500 MCG tablet Take 500 mcg by mouth Daily.   2/19/2023   • vitamin D3 125 MCG (5000 UT) capsule capsule Take 5,000 Units by mouth Daily.   Past Month   • aspirin 81 MG EC tablet Take 81 mg by mouth Daily.   2/13/2023   • multivitamin with minerals tablet tablet Take 1 tablet by mouth Daily.   2/18/2023   • Wheat  Dextrin (BENEFIBER PO) Take 1 dose by mouth As Needed.   2023         PMH:   Past Medical History:   Diagnosis Date   • Arthritis    • Atrial fibrillation (HCC) 2021    Afib/ A flutter- required manual cardioversion   • BPH (benign prostatic hyperplasia)    • Colon disorder     floppy colon   • Coronary artery disease    • Diverticulosis    • JOHNSON (dyspnea on exertion)    • GERD (gastroesophageal reflux disease)    • History of COVID-19     2021 ( infusion) / 2022 no rx   • Hyperlipidemia    • Hypertension    • Kidney stones     x100s -   had to have surgery for 3-4 x   • Renal cyst, right    • TB (tuberculosis), treated    • Torn meniscus     left knee   • Wears glasses     readers   • Wears reading eyeglasses      PSH:    Past Surgical History:   Procedure Laterality Date   • ATRIAL APPENDAGE EXCLUSION LEFT WITH TRANSESOPHAGEAL ECHOCARDIOGRAM N/A 2022    Procedure: Atrial Appendage Occlusion on xarelto 15 mg daily DNS;  Surgeon: Shaun Garcia DO;  Location:  [facility];  Service: Cardiology;  Laterality: N/A;   • CARDIAC CATHETERIZATION N/A 2017    Procedure: Left Heart Cath;  Surgeon: Aidan Cesar MD;  Location:  [facility];  Service:    • CARDIAC CATHETERIZATION N/A 2022    Procedure: Left Heart Cath 79451;  Surgeon: Roni Saab MD;  Location:  [facility];  Service: Cardiology;  Laterality: N/A;   • CATARACT EXTRACTION Bilateral    • COLONOSCOPY     • CORONARY STENT PLACEMENT  March 8, 2017    x1   • KIDNEY STONE SURGERY      x3-4   • RETINAL DETACHMENT REPAIR Left    • TONSILLECTOMY         Immunization History:  Influenza: No  Pneumococcal: No  Tetanus: No  Covid : No    Social History:   Tobacco:   Social History     Tobacco Use   Smoking Status Former   • Packs/day: 0.25   • Years: 3.00   • Pack years: 0.75   • Types: Cigarettes   • Quit date: 1978   • Years since quittin.1   Smokeless Tobacco Never    Tobacco Comments    Social       Alcohol:     Social History     Substance and Sexual Activity   Alcohol Use Yes   • Alcohol/week: 2.0 standard drinks   • Types: 2 Cans of beer per week    Comment: Or less         Physical Exam:/100 (BP Location: Right arm, Patient Position: Lying)   Pulse 77   Temp 98.4 °F (36.9 °C) (Temporal)   Resp 18   SpO2 96%       General Appearance:    Alert, cooperative, no distress, appears stated age   Head:    Normocephalic, without obvious abnormality, atraumatic   Lungs:     Clear to auscultation bilaterally, respirations unlabored    Heart:   Regular rate and rhythm, S1 and S2 normal    Abdomen:    Soft without tenderness   Extremities:   Extremities normal, atraumatic, no cyanosis or edema   Skin:   Skin color, texture, turgor normal, no rashes or lesions   Neurologic:   Grossly intact     Results Review:     LABS:  Lab Results   Component Value Date    WBC 7.41 02/13/2023    HGB 15.7 02/13/2023    HCT 46.4 02/13/2023    MCV 96.1 02/13/2023     02/13/2023    NEUTROABS 5.59 04/13/2021    GLUCOSE 103 (H) 02/13/2023    BUN 17 02/13/2023    CREATININE 0.94 02/13/2023    EGFRIFNONA 55 (L) 04/13/2021     02/13/2023    K 4.9 02/13/2023     02/13/2023    CO2 29.0 02/13/2023    CALCIUM 9.9 02/13/2023    ALBUMIN 4.70 04/13/2021    AST 34 04/13/2021    ALT 32 04/13/2021    BILITOT 0.7 04/13/2021       RADIOLOGY:  Imaging Results (Last 72 Hours)     ** No results found for the last 72 hours. **          I reviewed the patient's new clinical results.    Cancer Staging (if applicable)  Cancer Patient: __ yes __no __unknown; If yes, clinical stage T:__ N:__M:__, stage group or __N/A      Impression: Benign prostatic hyperplasia with urinary frequency       Plan: CYSTOSCOPY TRANSURETHRAL RESECTION OF PROSTATE ASHA Whyte, JULIO   2/20/2023   07:55 EST

## 2023-02-22 ENCOUNTER — OFFICE VISIT (OUTPATIENT)
Dept: UROLOGY | Facility: CLINIC | Age: 68
End: 2023-02-22
Payer: MEDICARE

## 2023-02-22 ENCOUNTER — TELEPHONE (OUTPATIENT)
Dept: UROLOGY | Facility: CLINIC | Age: 68
End: 2023-02-22

## 2023-02-22 VITALS — BODY MASS INDEX: 29.4 KG/M2 | WEIGHT: 210 LBS | HEART RATE: 72 BPM | OXYGEN SATURATION: 98 % | HEIGHT: 71 IN

## 2023-02-22 DIAGNOSIS — N20.0 NEPHROLITHIASIS: Primary | ICD-10-CM

## 2023-02-22 DIAGNOSIS — N40.1 BENIGN PROSTATIC HYPERPLASIA WITH URINARY FREQUENCY: Primary | ICD-10-CM

## 2023-02-22 DIAGNOSIS — R35.0 BENIGN PROSTATIC HYPERPLASIA WITH URINARY FREQUENCY: Primary | ICD-10-CM

## 2023-02-22 PROCEDURE — 99024 POSTOP FOLLOW-UP VISIT: CPT | Performed by: STUDENT IN AN ORGANIZED HEALTH CARE EDUCATION/TRAINING PROGRAM

## 2023-02-22 RX ORDER — POTASSIUM CITRATE 10 MEQ/1
10 TABLET, EXTENDED RELEASE ORAL
Qty: 100 TABLET | Refills: 5 | Status: SHIPPED | OUTPATIENT
Start: 2023-02-22

## 2023-02-22 NOTE — TELEPHONE ENCOUNTER
Provider: DR. SALTER    Caller: ALAN LOZANO    Relationship to Patient: SPOUSE    Pharmacy: [facility] [address] [place] [address] [address]  [phone] [facility] [phone]     Phone Number: [phone]    Reason for Call: MEDICATION QUESTION    When was the patient last seen: 02/22/23 @ 8:40 AM)    Notes: PT WAS SEEN BY DR. SALTER TODAY (02/22/23 @ 8:40 AM). PT FORGOT TO ASK IF DR. SALTER IS WILLING TO START PRESCRIBING HIM POTASSIUM CITRATE PO [46051]. PT HAS BEEN TAKING IT FOR SEVERAL YEARS, AND HAS NOT HAD TO GO INTO THE HOSPITAL WITH KIDNEY STONES SINCE TAKING IT. PT WAS GETTING THE MEDICATION FROM THE OTHER UROLOGIST, BUT PT WANTS TO SWITCH SOLELY TO DR. SALTER AND HAVE DR. SALTER PRESCRIBE THE MEDICATION.    PT IS COMPLETELY OUT OF POTASSIUM CITRATE PO [40239]. PLEASE CALL ALAN [phone] OR BOLA [phone] TO DISCUSS.       ---UNABLE TO WARM TRANSFER

## 2023-02-22 NOTE — PROGRESS NOTES
Follow Up Office Visit      Patient Name: Justus Gaitan  : 1955   MRN: [id number]     Chief Complaint:    Chief Complaint   Patient presents with   • Benign Prostatic Hypertrophy       Referring Provider: No ref. provider found    History of Present Illness: Justus Gaitan is a 67 y.o. male who presents today for follow up of BPH and urinary obstruction.  The patient has been previously managed with tamsulosin and finasteride.  He was found to have intravesical median lobe and lateral lobe hyperplasia, prostate volume around 28 to 30 cc.  He underwent greenlight photo vaporization of the prostate on 2023.  He has been using oxybutynin, Levsin for bladder spasm prevent, nitrofurantoin for UTI prophylaxis.  His catheter was removed after instilling 200 cc of sterile water, he passed his trial of void today.  He has mild hematuria.  He is taking his aspirin and I recommend he stop this for the next 4 to 5 days to prevent bleeding issues.  We will stop his Flomax and continue finasteride to prevent regrowth and bleeding issues.    Subjective      Review of System: Review of Systems   Constitutional: Negative.    HENT: Negative.    Eyes: Negative.    Respiratory: Negative.    Cardiovascular: Negative.    Gastrointestinal: Negative.    Endocrine: Negative.    Genitourinary: Negative.    Musculoskeletal: Negative.    Skin: Negative.    Allergic/Immunologic: Negative.    Neurological: Negative.    Hematological: Negative.    Psychiatric/Behavioral: Negative.       I have reviewed the ROS documented by my clinical staff, I have updated appropriately and I agree. Kelby Crum MD    I have reviewed and the following portions of the patient's history were updated as appropriate: past family history, past medical history, past social history, past surgical history and problem list.    Medications:     Current Outpatient Medications:   •  APPLE CIDER VINEGAR PO, Take 450 mg by mouth  "Daily., Disp: , Rfl:   •  aspirin 81 MG EC tablet, Take 81 mg by mouth Daily., Disp: , Rfl:   •  atorvastatin (LIPITOR) 40 MG tablet, Take 40 mg by mouth Every Night., Disp: , Rfl:   •  finasteride (PROSCAR) 5 MG tablet, Take 5 mg by mouth Daily., Disp: , Rfl:   •  metoprolol succinate XL (TOPROL-XL) 25 MG 24 hr tablet, Take 25 mg by mouth Daily., Disp: , Rfl:   •  multivitamin with minerals tablet tablet, Take 1 tablet by mouth Daily., Disp: , Rfl:   •  Omega-3 Fatty Acids (fish oil) 1000 MG capsule capsule, Take 2,000 mg by mouth Daily With Breakfast., Disp: , Rfl:   •  oxybutynin XL (Ditropan XL) 10 MG 24 hr tablet, Take 1 tablet by mouth Daily for 30 days. Prevents bladder urgency, Disp: 30 tablet, Rfl: 0  •  pantoprazole (PROTONIX) 40 MG EC tablet, Take 40 mg by mouth Daily., Disp: , Rfl:   •  POTASSIUM CITRATE PO, Take 1,080 mg by mouth 3 (Three) Times a Day. Patient takes 1 to 2 tablets daily, Disp: , Rfl:   •  vitamin B-12 (CYANOCOBALAMIN) 500 MCG tablet, Take 500 mcg by mouth Daily., Disp: , Rfl:   •  vitamin D3 125 MCG (5000 UT) capsule capsule, Take 5,000 Units by mouth Daily., Disp: , Rfl:   •  Wheat Dextrin (BENEFIBER PO), Take 1 dose by mouth As Needed., Disp: , Rfl:     Allergies:   Allergies   Allergen Reactions   • Sulfa Antibiotics Rash         Objective     Physical Exam:   Vital Signs:   Vitals:    02/22/23 0904   Pulse: 72   SpO2: 98%   Weight: 95.3 kg (210 lb)   Height: 180.3 cm (70.98\")   PainSc: 0-No pain     Body mass index is 29.3 kg/m².     Physical Exam  Constitutional:       Appearance: Normal appearance.   HENT:      Head: Normocephalic and atraumatic.      Nose: Nose normal.   Eyes:      Extraocular Movements: Extraocular movements intact.      Conjunctiva/sclera: Conjunctivae normal.      Pupils: Pupils are equal, round, and reactive to light.   Genitourinary:     Comments: Circumcised phallus, orthotopic meatus, bilaterally descended testicles, catheter in place draining clear yellow " urine.  Musculoskeletal:         General: Normal range of motion.      Cervical back: Normal range of motion and neck supple.   Skin:     General: Skin is warm and dry.      Findings: No lesion or rash.   Neurological:      General: No focal deficit present.      Mental Status: He is alert and oriented to person, place, and time. Mental status is at baseline.   Psychiatric:         Mood and Affect: Mood normal.         Behavior: Behavior normal.         Labs:   Brief Urine Lab Results  (Last result in the past 365 days)      Color   Clarity   Blood   Leuk Est   Nitrite   Protein   CREAT   Urine HCG        01/03/23 1108 Yellow   Clear   Negative   Negative   Negative   Negative                      Lab Results   Component Value Date    GLUCOSE 103 (H) 02/13/2023    CALCIUM 9.9 02/13/2023     02/13/2023    K 4.9 02/13/2023    CO2 29.0 02/13/2023     02/13/2023    BUN 17 02/13/2023    CREATININE 0.94 02/13/2023    EGFRIFNONA 55 (L) 04/13/2021    BCR 18.1 02/13/2023    ANIONGAP 8.0 02/13/2023       Lab Results   Component Value Date    WBC 7.41 02/13/2023    HGB 15.7 02/13/2023    HCT 46.4 02/13/2023    MCV 96.1 02/13/2023     02/13/2023       Images:   US Renal Bilateral    Result Date: 1/11/2023  Impression: 1. Rounded 10 x 12 mm mass in bladder which could relate to bladder mass or debris. Consider follow-up CT abdomen and pelvis with contrast or cystoscopy. 2. Prominent cortex of left mid kidney likely column of Julio. This could also be further characterized on CT. 3. Kidneys without hydronephrosis. 4. Right lower pole 2.1 cm renal cyst. Electronically Signed: Carlos Mouser  1/11/2023 7:58 PM EST  Workstation ID: [id number]      Measures:   Tobacco:   Justus Gaitan  reports that he quit smoking about 45 years ago. His smoking use included cigarettes. He has a 0.75 pack-year smoking history. He has been exposed to tobacco smoke. He has never used smokeless tobacco.    Assessment / Plan       Assessment/Plan:   67 y.o. male who presented today for follow up of BPH and refractory urinary obstruction, status post greenlight PVP recently.  Catheter removal performed today, trial of void passed.  Discussed return precautions for infection signs or persistent hematuria.  He will hold aspirin for the next 4 to 5 days to prevent bleeding issues.  He will continue his finasteride to prevent bleeding problems and to prevent regrowth long-term and we will stop his tamsulosin now.  He will use oxybutynin for the next 30 days to prevent bladder spasms.  Return precautions discussed.  We will see him back in 8 weeks for symptom check and PVR.  We discussed the potential for stress urinary incontinence which may be transient for the next few weeks.  Kegel exercise education provided.    Diagnoses and all orders for this visit:    1. Benign prostatic hyperplasia with urinary frequency (Primary)  -Stop tamsulosin, continue finasteride  -Continue oxybutynin x30 days to prevent bladder spasm  -Follow-up in 8 weeks for PVR and symptom check         Follow Up:   Return in about 8 weeks (around 4/19/2023).    I spent approximately 20 minutes providing clinical care for this patient; including review of patient's chart and provider documentation, face to face time spent with patient in examination room (obtaining history, performing physical exam, discussing diagnosis and management options), placing orders, and completing patient documentation.     Kelby Crum MD  [facility] [place]

## 2023-04-19 ENCOUNTER — OFFICE VISIT (OUTPATIENT)
Dept: UROLOGY | Facility: CLINIC | Age: 68
End: 2023-04-19
Payer: MEDICARE

## 2023-04-19 VITALS — BODY MASS INDEX: 29.31 KG/M2 | HEIGHT: 71 IN

## 2023-04-19 DIAGNOSIS — N40.1 BENIGN PROSTATIC HYPERPLASIA WITH URINARY FREQUENCY: Primary | ICD-10-CM

## 2023-04-19 DIAGNOSIS — R35.0 BENIGN PROSTATIC HYPERPLASIA WITH URINARY FREQUENCY: Primary | ICD-10-CM

## 2023-04-19 DIAGNOSIS — N52.9 ERECTILE DYSFUNCTION, UNSPECIFIED ERECTILE DYSFUNCTION TYPE: ICD-10-CM

## 2023-04-19 DIAGNOSIS — N20.0 NEPHROLITHIASIS: ICD-10-CM

## 2023-04-19 DIAGNOSIS — R82.81 PYURIA: ICD-10-CM

## 2023-04-19 DIAGNOSIS — R30.0 DYSURIA: ICD-10-CM

## 2023-04-19 DIAGNOSIS — Z87.448 HISTORY OF HEMATURIA: ICD-10-CM

## 2023-04-19 LAB
BILIRUB BLD-MCNC: NEGATIVE MG/DL
CLARITY, POC: CLEAR
COLOR UR: YELLOW
EXPIRATION DATE: ABNORMAL
GLUCOSE UR STRIP-MCNC: NEGATIVE MG/DL
KETONES UR QL: NEGATIVE
LEUKOCYTE EST, POC: ABNORMAL
Lab: ABNORMAL
NITRITE UR-MCNC: NEGATIVE MG/ML
PH UR: 6.5 [PH] (ref 5–8)
PROT UR STRIP-MCNC: NEGATIVE MG/DL
RBC # UR STRIP: ABNORMAL /UL
SP GR UR: 1.01 (ref 1–1.03)
UROBILINOGEN UR QL: NORMAL

## 2023-04-19 PROCEDURE — 87086 URINE CULTURE/COLONY COUNT: CPT | Performed by: STUDENT IN AN ORGANIZED HEALTH CARE EDUCATION/TRAINING PROGRAM

## 2023-04-19 RX ORDER — PHENAZOPYRIDINE HYDROCHLORIDE 200 MG/1
200 TABLET, FILM COATED ORAL 3 TIMES DAILY PRN
Qty: 20 TABLET | Refills: 1 | Status: SHIPPED | OUTPATIENT
Start: 2023-04-19

## 2023-04-19 RX ORDER — CEFDINIR 300 MG/1
300 CAPSULE ORAL 2 TIMES DAILY
Qty: 10 CAPSULE | Refills: 0 | Status: SHIPPED | OUTPATIENT
Start: 2023-04-19

## 2023-04-19 NOTE — PATIENT INSTRUCTIONS
Lemon Juice and Kidney Stone Prevention     - Citrus juices, including lemon juice and orange juice, contain citrate, which acts as a stone inhibitor for calcium based stones. Citrate seems to do this by binding calcium, making it unavailable to combine with oxalate or phosphate: a necessary first step in the formation of stones. Citrate also seems to make it more difficult for stones to grow once they've formed.    - Drinking citrus juice in the form of concentrated lemon juice mixed with water has been shown to effectively increase urinary citrate levels and reduce urinary calcium levels, both of which will reduce stone risk. Orange juice will similarly increase urinary citrate levels. However, orange juice appears to also increase urinary oxalate levels (a stone promoter). Other sources of citrate, including grapefruit juice, have had less research completed confirming their beneficial effects on urinary citrate levels. Therefore, lemon juice is typically favored over other citrus juices as a natural method to increase urinary citrate levels.  Many patients find drinking citrus juices to be an attractive alternative to pharmaceutical treatment with potassium citrate.    - We recommend that stone formers consider supplementing their daily fluid intake with a mixture of 3-4 ounces (> mL) of concentrated lemon juice in one liter of water to increase their urinary citrate levels.

## 2023-04-19 NOTE — PROGRESS NOTES
Follow Up Office Visit      Patient Name: Justus Gaitan  : 1955   MRN: [id number]     Chief Complaint:    Chief Complaint   Patient presents with   • Benign Prostatic Hypertrophy     frequency       Referring Provider: No ref. provider found    History of Present Illness: Justus Gaitan is a 67 y.o. male who presents today for follow up of nephrolithiasis history, erectile dysfunction, BPH and urinary obstruction.  Originally evaluated in December.  History of hematuria status post previously negative work-up, has a known minimally complex right-sided cyst which we will monitor.  Multiple bilateral nonobstructing kidney stones.  Patient states he has been passing a stone every 3 months.  No recent imaging to evaluate.  Previously on finasteride and Flomax for BPH.  Underwent a full work-up in my clinic in January demonstrating 28 cc prostate with significant lateral lobe hyperplasia with moderate intravesical median lobe.  He proceeded to the operating room for cystoscopy and greenlight photo vaporization of the prostate on 2023.  As a result of his procedure he reports he significantly improved strength of stream.  He states he had intermittent bleeding for roughly the first month after surgery.  He has since discontinued tamsulosin but remains on finasteride.    Normal PSA trend previously.  Today, reports mild urgency, states he never used the oxybutynin prescribed to him after surgery for bladder spasm prevention but he is willing to try.   Reports some ongoing dysuria and intermittent bleeding over the last few days.  His urinalysis today is positive for leukocytes and blood.  We will send a urine culture and treat him empirically with antibiotics.  PVR reassuring at 40 mL.  Preoperative PVR was 176 mL.    IPSS score today 14, reports being mostly satisfied.  Preoperative IPSS was 14.    F/u 6 months for repeat CT scan for stone surveillance    Subjective      Review of  System: Review of Systems   Genitourinary: Positive for dysuria and frequency.      I have reviewed the ROS documented by my clinical staff, I have updated appropriately and I agree. Kelby Crum MD    I have reviewed and the following portions of the patient's history were updated as appropriate: past family history, past medical history, past social history, past surgical history and problem list.    Medications:     Current Outpatient Medications:   •  APPLE CIDER VINEGAR PO, Take 450 mg by mouth Daily., Disp: , Rfl:   •  aspirin 81 MG EC tablet, Take 1 tablet by mouth Daily., Disp: , Rfl:   •  atorvastatin (LIPITOR) 40 MG tablet, Take 1 tablet by mouth Every Night., Disp: , Rfl:   •  finasteride (PROSCAR) 5 MG tablet, Take 1 tablet by mouth Daily., Disp: , Rfl:   •  metoprolol succinate XL (TOPROL-XL) 25 MG 24 hr tablet, Take 1 tablet by mouth Daily., Disp: , Rfl:   •  multivitamin with minerals tablet tablet, Take 1 tablet by mouth Daily., Disp: , Rfl:   •  Omega-3 Fatty Acids (fish oil) 1000 MG capsule capsule, Take 2 capsules by mouth Daily With Breakfast., Disp: , Rfl:   •  pantoprazole (PROTONIX) 40 MG EC tablet, Take 1 tablet by mouth Daily., Disp: , Rfl:   •  potassium citrate (UROCIT-K) 10 MEQ (1080 MG) CR tablet, Take 1 tablet by mouth 3 (Three) Times a Day With Meals., Disp: 100 tablet, Rfl: 5  •  vitamin B-12 (CYANOCOBALAMIN) 500 MCG tablet, Take 1 tablet by mouth Daily., Disp: , Rfl:   •  vitamin D3 125 MCG (5000 UT) capsule capsule, Take 1 capsule by mouth Daily., Disp: , Rfl:   •  Wheat Dextrin (BENEFIBER PO), Take 1 dose by mouth As Needed., Disp: , Rfl:   •  cefdinir (OMNICEF) 300 MG capsule, Take 1 capsule by mouth 2 (Two) Times a Day., Disp: 10 capsule, Rfl: 0  •  phenazopyridine (Pyridium) 200 MG tablet, Take 1 tablet by mouth 3 (Three) Times a Day As Needed (Dysuria)., Disp: 20 tablet, Rfl: 1    Allergies:   Allergies   Allergen Reactions   • Sulfa Antibiotics Rash       IPSS  Questionnaire (AUA-7):  Over the past month…    1)  Incomplete Emptying:       How often have you had a sensation of not emptying you had the sensation of not emptying your bladder completely after you finished urinating?  3 - About half the time   2)  Frequency:       How often have you had the urinate again less than two hours after you finished urinating?  3 - About half the time   3)  Intermittency:       How often have you found you stopped and started again several times when you urinated?   1 - Less than 1 time in 5   4) Urgency:      How often have you found it difficult to postpone urination?  3 - About half the time   5) Weak Stream:      How often have you had a weak urinary stream?  2 - Less than half the time   6) Straining:       How often have you had to push or strain to begin urination?  0 - Not at all   7) Nocturia:      How many times did you most typically get up to urinate from the time you went to bed at night until the time you got up in the morning?  2 - 2 times   Total Score:  14   The International Prostate Symptom Score (IPSS) is used to screen, diagnose, track symptoms of benign prostatic hyperplasia (BPH).   0-7 (Mild Symptoms) 8-19 (Moderate) 20-35 (Severe)   Quality of Life (QoL):  If you were to spend the rest of your life with your urinary condition just the way it is now, how would you feel about that? 2-Mostly Satisfied   Urine Leakage (Incontinence) 0-No Leakage     Sexual Health Inventory for Men (OSORIO)   Over the past 6 months:     1. How do you rate your confidence that you could get and keep an erection?  2 - Low   2. When you had erections with sexual  stimulation, how often were your erections hard enough for penetration (entering your partner)?  4 - Most times ( much more than, half the time)   3. During sexual intercourse, how often were you able to maintain your erection after you had penetrated (entered) your partner?  4 - Most times ( much more than, half the time)   4.  "During sexual intercourse, how difficult was it to maintain your erection to completion of intercourse?  4 - Sightly difficult    5. When you attempted sexual intercourse, how often was it satisfactory for you?  4 - Most times ( much more than, half the time)    Total Score: 18   The Sexual Health Inventory for Men further classifies ED severity with the following breakpoints:   1-7 (Severe ED) 8-11 (Moderate ED) 12-16 (Mild to Moderate ED) 17-21 (Mild ED)      Post void residual bladder scan:   43 mL    Objective     Physical Exam:   Vital Signs:   Vitals:    04/19/23 1320   Height: 180.3 cm (70.98\")     Body mass index is 29.31 kg/m².     Physical Exam  Constitutional:       Appearance: Normal appearance.   HENT:      Head: Normocephalic and atraumatic.      Nose: Nose normal.   Eyes:      Extraocular Movements: Extraocular movements intact.      Conjunctiva/sclera: Conjunctivae normal.      Pupils: Pupils are equal, round, and reactive to light.   Musculoskeletal:         General: Normal range of motion.      Cervical back: Normal range of motion and neck supple.   Skin:     General: Skin is warm and dry.      Findings: No lesion or rash.   Neurological:      General: No focal deficit present.      Mental Status: He is alert and oriented to person, place, and time. Mental status is at baseline.   Psychiatric:         Mood and Affect: Mood normal.         Behavior: Behavior normal.         Labs:   Brief Urine Lab Results  (Last result in the past 365 days)      Color   Clarity   Blood   Leuk Est   Nitrite   Protein   CREAT   Urine HCG        04/19/23 1332 Yellow   Clear   3+   Moderate (2+)   Negative   Negative                      Lab Results   Component Value Date    GLUCOSE 103 (H) 02/13/2023    CALCIUM 9.9 02/13/2023     02/13/2023    K 4.9 02/13/2023    CO2 29.0 02/13/2023     02/13/2023    BUN 17 02/13/2023    CREATININE 0.94 02/13/2023    EGFRIFNONA 55 (L) 04/13/2021    BCR 18.1 02/13/2023    " ANIONGAP 8.0 02/13/2023       Lab Results   Component Value Date    WBC 7.41 02/13/2023    HGB 15.7 02/13/2023    HCT 46.4 02/13/2023    MCV 96.1 02/13/2023     02/13/2023       Images:   US Renal Bilateral    Result Date: 1/11/2023  Impression: 1. Rounded 10 x 12 mm mass in bladder which could relate to bladder mass or debris. Consider follow-up CT abdomen and pelvis with contrast or cystoscopy. 2. Prominent cortex of left mid kidney likely column of Julio. This could also be further characterized on CT. 3. Kidneys without hydronephrosis. 4. Right lower pole 2.1 cm renal cyst. Electronically Signed: Carlos Mouser  1/11/2023 7:58 PM EST  Workstation ID: [id number]      Measures:   Tobacco:   Justus Gaitan  reports that he quit smoking about 45 years ago. His smoking use included cigarettes. He has a 0.75 pack-year smoking history. He has been exposed to tobacco smoke. He has never used smokeless tobacco..  Assessment / Plan      Assessment/Plan:   67 y.o. male who presented today for follow up of nephrolithiasis, erectile dysfunction, BPH and urinary obstruction status post greenlight PVP in February 2023.  He is 8 weeks postop but still having persistent urgency and frequency and intermittent bleeding.  We will send him a course of antibiotics for empiric treatment and send urine culture today.  Discussed he may continue to have some intermittent urgency as he heals from his procedure.  This may take a few more months to resolve.  I have asked him to consider starting oxybutynin for urgency prevention.  We will see him back in 6 months with repeat CT scan to assess for kidney stone burden given his history of passing stones.  He denies flank pain currently.  I will also prescribe him Pyridium for his dysuria.  He will continue to use PDE 5 inhibitors empirically for his erectile dysfunction which is well managed.  I will check a PSA given his history of BPH, previously normal PSA trend    Diagnoses and  all orders for this visit:    1. Benign prostatic hyperplasia with urinary frequency (Primary)  -     POC Urinalysis Dipstick, Automated  -     PSA Diagnostic; Future    2. Erectile dysfunction, unspecified erectile dysfunction type    3. History of hematuria  -     POC Urinalysis Dipstick, Automated    4. Nephrolithiasis  -     POC Urinalysis Dipstick, Automated  -     CT Abdomen Pelvis Stone Protocol; Future    5. Dysuria  -     Urine Culture - Urine, Urine, Clean Catch; Future  -     cefdinir (OMNICEF) 300 MG capsule; Take 1 capsule by mouth 2 (Two) Times a Day.  Dispense: 10 capsule; Refill: 0  -     phenazopyridine (Pyridium) 200 MG tablet; Take 1 tablet by mouth 3 (Three) Times a Day As Needed (Dysuria).  Dispense: 20 tablet; Refill: 1    6. Pyuria  -     Urine Culture - Urine, Urine, Clean Catch; Future  -     cefdinir (OMNICEF) 300 MG capsule; Take 1 capsule by mouth 2 (Two) Times a Day.  Dispense: 10 capsule; Refill: 0           Follow Up:   Return in about 6 months (around 10/19/2023) for Follow Up after Imaging, Follow Up after Labs.    I spent approximately 30 minutes providing clinical care for this patient; including review of patient's chart and provider documentation, face to face time spent with patient in examination room (obtaining history, performing physical exam, discussing diagnosis and management options), placing orders, and completing patient documentation.     Kelby Crum MD  [facility] [place]

## 2023-04-20 ENCOUNTER — TELEPHONE (OUTPATIENT)
Dept: UROLOGY | Facility: CLINIC | Age: 68
End: 2023-04-20
Payer: MEDICARE

## 2023-04-20 LAB — BACTERIA SPEC AEROBE CULT: NO GROWTH

## 2023-04-20 NOTE — TELEPHONE ENCOUNTER
----- Message from Kelby Crum MD sent at 4/20/2023 11:29 AM EDT -----  Please let patient know his urine culture is negative thanks    Kelby Crum MD

## 2023-06-12 ENCOUNTER — TELEPHONE (OUTPATIENT)
Dept: UROLOGY | Facility: CLINIC | Age: 68
End: 2023-06-12
Payer: MEDICARE

## 2023-06-12 DIAGNOSIS — R39.15 URINARY URGENCY: Primary | ICD-10-CM

## 2023-06-12 RX ORDER — OXYBUTYNIN CHLORIDE 10 MG/1
10 TABLET, EXTENDED RELEASE ORAL DAILY
Qty: 30 TABLET | Refills: 2 | Status: SHIPPED | OUTPATIENT
Start: 2023-06-12 | End: 2023-07-12

## 2023-06-12 NOTE — TELEPHONE ENCOUNTER
Patient's wife called and stated that Dr. Crum told him to restart oxybutynin at his last appointment and he is out and needs a refill.

## 2023-06-13 NOTE — TELEPHONE ENCOUNTER
I called the patient but reached his vm, I left him a msg and let him know that his meds were sent to the pharmacy.

## 2023-08-28 DIAGNOSIS — R39.15 URINARY URGENCY: ICD-10-CM

## 2023-08-28 RX ORDER — OXYBUTYNIN CHLORIDE 10 MG/1
10 TABLET, EXTENDED RELEASE ORAL DAILY
Qty: 30 TABLET | Refills: 0 | Status: SHIPPED | OUTPATIENT
Start: 2023-08-28 | End: 2023-09-27

## 2023-09-19 DIAGNOSIS — N40.1 BPH WITH OBSTRUCTION/LOWER URINARY TRACT SYMPTOMS: Primary | ICD-10-CM

## 2023-09-19 DIAGNOSIS — N13.8 BPH WITH OBSTRUCTION/LOWER URINARY TRACT SYMPTOMS: Primary | ICD-10-CM

## 2023-09-19 RX ORDER — FINASTERIDE 5 MG/1
5 TABLET, FILM COATED ORAL DAILY
Qty: 90 TABLET | Refills: 3 | Status: SHIPPED | OUTPATIENT
Start: 2023-09-19

## 2023-09-25 DIAGNOSIS — R39.15 URINARY URGENCY: ICD-10-CM

## 2023-09-25 RX ORDER — OXYBUTYNIN CHLORIDE 10 MG/1
TABLET, EXTENDED RELEASE ORAL
Qty: 30 TABLET | Refills: 0 | Status: SHIPPED | OUTPATIENT
Start: 2023-09-25

## 2023-09-25 NOTE — TELEPHONE ENCOUNTER
Rx Refill Note  Requested Prescriptions     Pending Prescriptions Disp Refills    oxybutynin XL (DITROPAN-XL) 10 MG 24 hr tablet [Pharmacy Med Name: Oxybutynin Chloride ER 10 MG Oral Tablet Extended Release 24 Hour] 30 tablet 0     Sig: Take 1 tablet by mouth once daily      Last office visit with prescribing clinician: 04/29/2023  Last telemedicine visit with prescribing clinician: Visit date not found   Next office visit with prescribing clinician: 10/23/2023    Sydnie Pastrana MA  09/25/23, 09:27 EDT

## 2023-10-04 ENCOUNTER — HOSPITAL ENCOUNTER (OUTPATIENT)
Dept: CARDIOLOGY | Facility: HOSPITAL | Age: 68
Discharge: HOME | End: 2023-10-04
Admitting: INTERNAL MEDICINE
Payer: MEDICARE

## 2023-10-04 VITALS
RESPIRATION RATE: 13 BRPM | WEIGHT: 210 LBS | BODY MASS INDEX: 29.4 KG/M2 | HEART RATE: 78 BPM | HEIGHT: 71 IN | DIASTOLIC BLOOD PRESSURE: 97 MMHG | SYSTOLIC BLOOD PRESSURE: 116 MMHG | OXYGEN SATURATION: 95 %

## 2023-10-04 DIAGNOSIS — I48.0 PAROXYSMAL ATRIAL FIBRILLATION: ICD-10-CM

## 2023-10-04 DIAGNOSIS — Z95.818 PRESENCE OF WATCHMAN LEFT ATRIAL APPENDAGE CLOSURE DEVICE: ICD-10-CM

## 2023-10-04 PROCEDURE — 93325 DOPPLER ECHO COLOR FLOW MAPG: CPT

## 2023-10-04 PROCEDURE — 76376 3D RENDER W/INTRP POSTPROCES: CPT

## 2023-10-04 PROCEDURE — 25010000002 FENTANYL CITRATE (PF) 50 MCG/ML SOLUTION: Performed by: INTERNAL MEDICINE

## 2023-10-04 PROCEDURE — 25010000002 MIDAZOLAM PER 1 MG: Performed by: INTERNAL MEDICINE

## 2023-10-04 PROCEDURE — 93321 DOPPLER ECHO F-UP/LMTD STD: CPT

## 2023-10-04 PROCEDURE — 93312 ECHO TRANSESOPHAGEAL: CPT

## 2023-10-04 RX ORDER — MIDAZOLAM HYDROCHLORIDE 1 MG/ML
INJECTION INTRAMUSCULAR; INTRAVENOUS
Status: COMPLETED | OUTPATIENT
Start: 2023-10-04 | End: 2023-10-04

## 2023-10-04 RX ORDER — FENTANYL CITRATE 50 UG/ML
INJECTION, SOLUTION INTRAMUSCULAR; INTRAVENOUS
Status: COMPLETED | OUTPATIENT
Start: 2023-10-04 | End: 2023-10-04

## 2023-10-04 RX ADMIN — MIDAZOLAM 2 MG: 1 INJECTION INTRAMUSCULAR; INTRAVENOUS at 09:20

## 2023-10-04 RX ADMIN — FENTANYL CITRATE 50 MCG: 50 INJECTION, SOLUTION INTRAMUSCULAR; INTRAVENOUS at 09:19

## 2023-10-04 RX ADMIN — MIDAZOLAM 2 MG: 1 INJECTION INTRAMUSCULAR; INTRAVENOUS at 09:15

## 2023-10-04 NOTE — H&P
[facility] Cardiology   History and Physical               Active Hospital Problems    Diagnosis  POA    Presence of Watchman left atrial appendage closure device [Z95.818]  Yes    Coronary artery disease [I25.10]  Yes     [facility], 3/8/2017: RICHARD to RCA, EF 50%  [facility], 3/29/2022: Normal EF, no critical obstructive CAD      Hyperlipidemia [E78.5]  Yes    Hypertension [I10]  Yes    Atrial fibrillation [I48.91]  Yes     Afib/ A flutter- required manual cardioversion April 2021  Echo, 3/10/2022: Normal LV, EF 65%.  Left atrium normal size.  Normal valvular morphology.  Left atrial appendage closure with 27 mm Watchman FLX device, 8-9-22                History of Present Illness:    Patient is a 66-year-old gentleman with a history of coronary artery disease status post PCI in 2017, paroxysmal atrial fibrillation/flutter, hypertension and hyperlipidemia. The patient had a GI bleed on anticoagulation and ultimately underwent placement of watchman device with Dr. Garcia on 8/9/2022. He presents today for his 1-year follow-up MANDIE. He denies any issues, illness, or hospitalizations since his last visit. Denies any chest pain, pressure, tightness, syncope, or near-syncope.     Allergies   Allergen Reactions    Sulfa Antibiotics Rash       Prior to Admission medications    Medication Sig Start Date End Date Taking? Authorizing Provider   aspirin 81 MG EC tablet Take 1 tablet by mouth Daily.   Yes Dennis Macario MD   metoprolol succinate XL (TOPROL-XL) 25 MG 24 hr tablet Take 1 tablet by mouth Daily.   Yes ProviderDennis MD   APPLE CIDER VINEGAR PO Take 450 mg by mouth Daily.    Dennis Macario MD   atorvastatin (LIPITOR) 40 MG tablet Take 1 tablet by mouth Every Night.    ProviderDennis MD   cefdinir (OMNICEF) 300 MG capsule Take 1 capsule by mouth 2 (Two) Times a Day. 4/19/23   Kelby Crum MD   finasteride (PROSCAR) 5 MG tablet Take 1 tablet by mouth Daily.  9/19/23   Kelby Crum MD   multivitamin with minerals tablet tablet Take 1 tablet by mouth Daily.    Dennis Macario MD   Omega-3 Fatty Acids (fish oil) 1000 MG capsule capsule Take 2 capsules by mouth Daily With Breakfast.    Dennis Macario MD   oxybutynin XL (DITROPAN-XL) 10 MG 24 hr tablet Take 1 tablet by mouth once daily 9/25/23   Joelle Chase APRN   pantoprazole (PROTONIX) 40 MG EC tablet Take 1 tablet by mouth Daily.    Dennis Macario MD   phenazopyridine (Pyridium) 200 MG tablet Take 1 tablet by mouth 3 (Three) Times a Day As Needed (Dysuria). 4/19/23   Kelby Crum MD   potassium citrate (UROCIT-K) 10 MEQ (1080 MG) CR tablet Take 1 tablet by mouth 3 (Three) Times a Day With Meals. 2/22/23   Kelby Crum MD   vitamin B-12 (CYANOCOBALAMIN) 500 MCG tablet Take 1 tablet by mouth Daily.    Dennis Macario MD   vitamin D3 125 MCG (5000 UT) capsule capsule Take 1 capsule by mouth Daily.    Dennis Macario MD   Wheat Dextrin (BENEFIBER PO) Take 1 dose by mouth As Needed.    Dennis Macario MD       Past Medical History:   Diagnosis Date    Arthritis     Atrial fibrillation 04/2021    Afib/ A flutter- required manual cardioversion    BPH (benign prostatic hyperplasia)     Colon disorder     floppy colon    Coronary artery disease     Diverticulosis     JOHNSON (dyspnea on exertion)     GERD (gastroesophageal reflux disease)     History of COVID-19     01/2021 ( infusion) / 1/2022 no rx    Hyperlipidemia     Hypertension     Kidney stones     x100s -   had to have surgery for 3-4 x    Renal cyst, right     TB (tuberculosis), treated     Torn meniscus     left knee    Wears glasses     readers    Wears reading eyeglasses        Past Surgical History:   Procedure Laterality Date    ATRIAL APPENDAGE EXCLUSION LEFT WITH TRANSESOPHAGEAL ECHOCARDIOGRAM N/A 08/09/2022    Procedure: Atrial Appendage Occlusion on xarelto 15 mg daily DNS;  Surgeon: Shaun Garcia DO;   Location:  [facility];  Service: Cardiology;  Laterality: N/A;    CARDIAC CATHETERIZATION N/A 2017    Procedure: Left Heart Cath;  Surgeon: Aidan Cesar MD;  Location:  [facility];  Service:     CARDIAC CATHETERIZATION N/A 2022    Procedure: Left Heart Cath 22409;  Surgeon: Roni Saab MD;  Location:  [facility];  Service: Cardiology;  Laterality: N/A;    CATARACT EXTRACTION Bilateral     COLONOSCOPY      CORONARY STENT PLACEMENT  March 8, 2017    x1    CYSTOSCOPY TRANSURETHRAL RESECTION OF PROSTATE N/A 2023    Procedure: CYSTOSCOPY TRANSURETHRAL RESECTION OF PROSTATE GREENLIGHT;  Surgeon: Kelby Crum MD;  Location:  [facility];  Service: Urology;  Laterality: N/A;    KIDNEY STONE SURGERY      x3-4    RETINAL DETACHMENT REPAIR Left     TONSILLECTOMY         Family History   Problem Relation Age of Onset    Arrhythmia Mother     Hypertension Mother     Hypertension Father        Social History     Tobacco Use   Smoking Status Former    Packs/day: 0.25    Years: 3.00    Pack years: 0.75    Types: Cigarettes    Quit date: 1978    Years since quittin.7    Passive exposure: Past   Smokeless Tobacco Never   Tobacco Comments    Social        Social History     Substance and Sexual Activity   Alcohol Use Yes    Alcohol/week: 2.0 standard drinks    Types: 2 Cans of beer per week    Comment: Or less         Review of Systems:   Review of Systems   Cardiovascular:  Negative for chest pain, dyspnea on exertion and near-syncope.   All other systems reviewed and are negative.         Vital Sign Min/Max for last 24 hours  No data recorded   No data recorded   No data recorded   No data recorded   No data recorded   No data recorded    No intake or output data in the 24 hours ending 10/04/23 0820        Tele:  NSR    Vitals reviewed.   Constitutional:       General: Awake.      Appearance: Healthy appearance.   Pulmonary:       Effort: Pulmonary effort is normal.      Breath sounds: Normal breath sounds.   Cardiovascular:      PMI at left midclavicular line. Normal rate. Regular rhythm. Normal S1. Normal S2.       Murmurs: There is no murmur.   Pulses:     Intact distal pulses.   Edema:     Peripheral edema absent.   Skin:     General: Skin is warm and dry.      Capillary Refill: Capillary refill takes less than 2 seconds.   Neurological:      Mental Status: Alert.            DATA REVIEW:      [unfilled]        No results found for: PHOS, MG          Lab Results   Component Value Date    HGBA1C 5.40 02/13/2023     No results found for: CHOL, CHLPL, TRIG, HDL, LDL, LDLDIRECT         Presence of Watchman YUSUF closure device  Atrial fibrillation  Afib/Aflutter: manual cardioversion 4/2021  Echo, 3/10/2022: Normal LV, EF 65%. Left atrium normal size. Normal valvular morphology.   Left atrial appendage closure with 27 mm Watchman FLX device 8/9/22   Has was transitioned off NOAC to DAPT, and is now on aspirin only.  Hypertension  Hyperlipidemia                Proceed with transesophageal echocardiogram.  Procedure, risks, benefits discussed and patient wishes to proceed.   Further recommendations to follow procedure.     JULIO Quiroga        Electronically signed by JULIO Quiroga, 10/04/23, 8:17 AM EDT.    Patient's post watchman MANDIE completed without complications.  The device is well-seated with no periprosthetic flow.  Left atrial appendage is excluded.  He will continue on aspirin.

## 2023-10-09 ENCOUNTER — HOSPITAL ENCOUNTER (OUTPATIENT)
Dept: CT IMAGING | Facility: HOSPITAL | Age: 68
Discharge: HOME | End: 2023-10-09
Admitting: STUDENT IN AN ORGANIZED HEALTH CARE EDUCATION/TRAINING PROGRAM
Payer: MEDICARE

## 2023-10-09 DIAGNOSIS — N20.0 NEPHROLITHIASIS: ICD-10-CM

## 2023-10-09 PROCEDURE — 74176 CT ABD & PELVIS W/O CONTRAST: CPT

## 2023-10-16 ENCOUNTER — TELEPHONE (OUTPATIENT)
Dept: UROLOGY | Facility: CLINIC | Age: 68
End: 2023-10-16
Payer: MEDICARE

## 2023-10-16 NOTE — TELEPHONE ENCOUNTER
LVM for pt, ask pt to get PSA test done prior to his upcoming appointment on 10/23/23.  Test is needed for f/u appointment.

## 2023-10-17 ENCOUNTER — LAB (OUTPATIENT)
Dept: LAB | Facility: HOSPITAL | Age: 68
End: 2023-10-17
Payer: MEDICARE

## 2023-10-17 DIAGNOSIS — R35.0 BENIGN PROSTATIC HYPERPLASIA WITH URINARY FREQUENCY: ICD-10-CM

## 2023-10-17 DIAGNOSIS — N40.1 BENIGN PROSTATIC HYPERPLASIA WITH URINARY FREQUENCY: ICD-10-CM

## 2023-10-17 LAB — PSA SERPL-MCNC: 0.06 NG/ML (ref 0–4)

## 2023-10-17 PROCEDURE — 36415 COLL VENOUS BLD VENIPUNCTURE: CPT

## 2023-10-17 PROCEDURE — 84153 ASSAY OF PSA TOTAL: CPT

## 2023-10-23 ENCOUNTER — OFFICE VISIT (OUTPATIENT)
Dept: UROLOGY | Facility: CLINIC | Age: 68
End: 2023-10-23
Payer: MEDICARE

## 2023-10-23 VITALS — HEART RATE: 75 BPM | WEIGHT: 210 LBS | BODY MASS INDEX: 29.4 KG/M2 | HEIGHT: 71 IN | OXYGEN SATURATION: 97 %

## 2023-10-23 DIAGNOSIS — R35.0 BENIGN PROSTATIC HYPERPLASIA WITH URINARY FREQUENCY: Primary | ICD-10-CM

## 2023-10-23 DIAGNOSIS — R39.9 LOWER URINARY TRACT SYMPTOMS (LUTS): ICD-10-CM

## 2023-10-23 DIAGNOSIS — N20.0 NEPHROLITHIASIS: ICD-10-CM

## 2023-10-23 DIAGNOSIS — N40.1 BENIGN PROSTATIC HYPERPLASIA WITH URINARY FREQUENCY: Primary | ICD-10-CM

## 2023-10-23 PROCEDURE — 1160F RVW MEDS BY RX/DR IN RCRD: CPT | Performed by: STUDENT IN AN ORGANIZED HEALTH CARE EDUCATION/TRAINING PROGRAM

## 2023-10-23 PROCEDURE — 51798 US URINE CAPACITY MEASURE: CPT | Performed by: STUDENT IN AN ORGANIZED HEALTH CARE EDUCATION/TRAINING PROGRAM

## 2023-10-23 PROCEDURE — 99214 OFFICE O/P EST MOD 30 MIN: CPT | Performed by: STUDENT IN AN ORGANIZED HEALTH CARE EDUCATION/TRAINING PROGRAM

## 2023-10-23 PROCEDURE — 1159F MED LIST DOCD IN RCRD: CPT | Performed by: STUDENT IN AN ORGANIZED HEALTH CARE EDUCATION/TRAINING PROGRAM

## 2023-10-23 PROCEDURE — 81003 URINALYSIS AUTO W/O SCOPE: CPT | Performed by: STUDENT IN AN ORGANIZED HEALTH CARE EDUCATION/TRAINING PROGRAM

## 2023-10-23 RX ORDER — POTASSIUM CITRATE 10 MEQ/1
10 TABLET, EXTENDED RELEASE ORAL 2 TIMES DAILY
Qty: 100 TABLET | Refills: 5 | Status: SHIPPED | OUTPATIENT
Start: 2023-10-23

## 2023-10-23 RX ORDER — TADALAFIL 10 MG/1
TABLET ORAL
COMMUNITY

## 2023-10-23 NOTE — PROGRESS NOTES
Follow Up Office Visit      Patient Name: Justus Gaitan  : 1955   MRN: [id number]     Chief Complaint:    Chief Complaint   Patient presents with    Benign prostatic hyperplasia with urinary frequency       Referring Provider: No ref. provider found    History of Present Illness: Justus Gaitan is a 68 y.o. male who presents today for follow up of BPH with lower urinary tract symptoms status post greenlight PVP, recurrent nephrolithiasis managing with potassium citrate.  Patient was last seen in my clinic in April.  Underwent greenlight PVP 2023.  He has done well.  Reports a strong stream.  Still has some mild urgency which she has been managing with intermittent oxybutynin 10 mg extended release tablets.  IPSS 6, PVR 27 mL, doing great.  He underwent a CT abdomen pelvis without contrast on 10/9/2023, imaging reviewed with the patient today.  This demonstrates 2 separate stones in the left midpole roughly 3 and 4 mm for a total of 7 mm stone burden.  He has additional tiny 1 mm nonobstructing stones on each kidney.  He has a history of complex cyst and these are not evaluated very well on this imaging but nothing concerning is noted.  He is remaining on finasteride to prevent prostatic regrowth.  PSA was recently very low and normal range.    Lab Results   Component Value Date    PSA 0.057 10/17/2023         Subjective      Review of System: Review of Systems   Genitourinary:  Negative for decreased urine volume, difficulty urinating, dysuria, enuresis, flank pain, frequency, hematuria and urgency.      I have reviewed the ROS documented by my clinical staff, I have updated appropriately and I agree. Kelby Crum MD    I have reviewed and the following portions of the patient's history were updated as appropriate: past family history, past medical history, past social history, past surgical history and problem list.    Medications:     Current Outpatient Medications:      APPLE CIDER VINEGAR PO, Take 450 mg by mouth Daily., Disp: , Rfl:     aspirin 81 MG EC tablet, Take 1 tablet by mouth Daily., Disp: , Rfl:     atorvastatin (LIPITOR) 40 MG tablet, Take 1 tablet by mouth Every Night., Disp: , Rfl:     finasteride (PROSCAR) 5 MG tablet, Take 1 tablet by mouth Daily., Disp: 90 tablet, Rfl: 3    metoprolol succinate XL (TOPROL-XL) 25 MG 24 hr tablet, Take 1 tablet by mouth Daily., Disp: , Rfl:     multivitamin with minerals tablet tablet, Take 1 tablet by mouth Daily., Disp: , Rfl:     Omega-3 Fatty Acids (fish oil) 1000 MG capsule capsule, Take 2 capsules by mouth Daily With Breakfast., Disp: , Rfl:     pantoprazole (PROTONIX) 40 MG EC tablet, Take 1 tablet by mouth Daily., Disp: , Rfl:     potassium citrate (UROCIT-K) 10 MEQ (1080 MG) CR tablet, Take 1 tablet by mouth 2 (Two) Times a Day., Disp: 100 tablet, Rfl: 5    tadalafil (CIALIS) 10 MG tablet, TAKE 1 TABLET BY MOUTH ONCE DAILY FOR ERECTILE DYSFUNCTION, Disp: , Rfl:     vitamin B-12 (CYANOCOBALAMIN) 500 MCG tablet, Take 1 tablet by mouth Daily., Disp: , Rfl:     vitamin D3 125 MCG (5000 UT) capsule capsule, Take 1 capsule by mouth Daily., Disp: , Rfl:     Wheat Dextrin (BENEFIBER PO), Take 1 dose by mouth As Needed., Disp: , Rfl:     Allergies:   Allergies   Allergen Reactions    Sulfa Antibiotics Rash       IPSS Questionnaire (AUA-7):  Over the past month…    1)  Incomplete Emptying:       How often have you had a sensation of not emptying you had the sensation of not emptying your bladder completely after you finished urinating?  0 - Not at all   2)  Frequency:       How often have you had the urinate again less than two hours after you finished urinating?  1 - Less than 1 time in 5   3)  Intermittency:       How often have you found you stopped and started again several times when you urinated?   1 - Less than 1 time in 5   4) Urgency:      How often have you found it difficult to postpone urination?  1 - Less than 1 time in 5  "  5) Weak Stream:      How often have you had a weak urinary stream?  1 - Less than 1 time in 5   6) Straining:       How often have you had to push or strain to begin urination?  0 - Not at all   7) Nocturia:      How many times did you most typically get up to urinate from the time you went to bed at night until the time you got up in the morning?  2 - 2 times   Total Score:  6   The International Prostate Symptom Score (IPSS) is used to screen, diagnose, track symptoms of benign prostatic hyperplasia (BPH).   0-7 (Mild Symptoms) 8-19 (Moderate) 20-35 (Severe)   Quality of Life (QoL):  If you were to spend the rest of your life with your urinary condition just the way it is now, how would you feel about that? 2-Mostly Satisfied   Urine Leakage (Incontinence) 0-No Leakage     Sexual Health Inventory for Men (OSORIO)   Over the past 6 months:     1. How do you rate your confidence that you could get and keep an erection?  3 - Moderate   2. When you had erections with sexual  stimulation, how often were your erections hard enough for penetration (entering your partner)?  4 - Most times ( much more than, half the time)   3. During sexual intercourse, how often were you able to maintain your erection after you had penetrated (entered) your partner?  3 - Sometimes (About half the time)    4. During sexual intercourse, how difficult was it to maintain your erection to completion of intercourse?  4 - Sightly difficult    5. When you attempted sexual intercourse, how often was it satisfactory for you?  5 - Almost always or always     Total Score: 19   The Sexual Health Inventory for Men further classifies ED severity with the following breakpoints:   1-7 (Severe ED) 8-11 (Moderate ED) 12-16 (Mild to Moderate ED) 17-21 (Mild ED)      Post void residual bladder scan:   027mL     Objective     Physical Exam:   Vital Signs:   Vitals:    10/23/23 0944   Pulse: 75   SpO2: 97%   Weight: 95.3 kg (210 lb)   Height: 180.3 cm (70.98\") "   PainSc: 0-No pain     Body mass index is 29.3 kg/m².     Physical Exam  Constitutional:       Appearance: Normal appearance.   HENT:      Head: Normocephalic and atraumatic.      Nose: Nose normal.   Eyes:      Extraocular Movements: Extraocular movements intact.      Conjunctiva/sclera: Conjunctivae normal.      Pupils: Pupils are equal, round, and reactive to light.   Musculoskeletal:         General: Normal range of motion.      Cervical back: Normal range of motion and neck supple.   Skin:     General: Skin is warm and dry.      Findings: No lesion or rash.   Neurological:      General: No focal deficit present.      Mental Status: He is alert and oriented to person, place, and time. Mental status is at baseline.   Psychiatric:         Mood and Affect: Mood normal.         Behavior: Behavior normal.         Labs:   Brief Urine Lab Results  (Last result in the past 365 days)        Color   Clarity   Blood   Leuk Est   Nitrite   Protein   CREAT   Urine HCG        10/23/23 0957 Yellow   Clear   Trace   Negative   Negative   Negative                   Urine Culture          4/19/2023    01:20   Urine Culture   Urine Culture No growth         Lab Results   Component Value Date    GLUCOSE 103 (H) 02/13/2023    CALCIUM 9.9 02/13/2023     02/13/2023    K 4.9 02/13/2023    CO2 29.0 02/13/2023     02/13/2023    BUN 17 02/13/2023    CREATININE 0.94 02/13/2023    EGFRIFNONA 55 (L) 04/13/2021    BCR 18.1 02/13/2023    ANIONGAP 8.0 02/13/2023       Lab Results   Component Value Date    WBC 7.41 02/13/2023    HGB 15.7 02/13/2023    HCT 46.4 02/13/2023    MCV 96.1 02/13/2023     02/13/2023       Images:   CT Abdomen Pelvis Stone Protocol    Result Date: 10/9/2023  Impression: Bilateral nephrolithiasis. No hydronephrosis. Other chronic findings as noted. Electronically Signed: Hue Prado MD  10/9/2023 12:37 PM EDT  Workstation ID: [id number]      Measures:   Tobacco:   Justus Jaya Kandy  reports that  he quit smoking about 45 years ago. His smoking use included cigarettes. He has a 0.75 pack-year smoking history. He has been exposed to tobacco smoke. He has never used smokeless tobacco.    Assessment / Plan      Assessment/Plan:   68 y.o. male who presented today for follow up of recurrent nephrolithiasis, managing with potassium citrate, BPH status post greenlight PVP, on finasteride.  PSA normal range.  CT reviewed, 7 mm stone burden within the left lower/mid pole.  We discussed elective treatment option including ureteroscopy and lithotripsy versus shockwave lithotripsy.  He is most interested in shockwave lithotripsy.  We discussed risk including bleeding, infection, flank pain, hematoma, anesthesia related complications.  Other options for his small stone burden is continued surveillance and repeat KUB in 6 months to determine stone burden.  We went to make sure this is visible on fluoroscopy as well prior to consideration of shockwave.  He elects for continued monitoring with repeat KUB in 6 months.  He will reach out to me if he would like to continue oxybutynin if his urgency persist.    Diagnoses and all orders for this visit:    1. Benign prostatic hyperplasia with urinary frequency (Primary)  - continue Finasteride  - will refill Oxybutynin for urgency symptoms if he would like, he will reach out for refill if necessary  - risks discussed     -     POC Urinalysis Dipstick, Automated    2. Lower urinary tract symptoms (LUTS)    3. Nephrolithiasis  -Patient has been adding salt to his food, we discussed to reduce his sodium intake, increase vegetables and fruit intake, reduce his animal protein intake, and try to get 2 L of water a day, he will continue potassium citrate for stone prevention  -We discussed ESWL versus ureteroscopy and lithotripsy, we will plan for KUB in 6 months for stone surveillance and discuss surgical interventions again at that time if necessary    -     potassium citrate  (UROCIT-K) 10 MEQ (1080 MG) CR tablet; Take 1 tablet by mouth 2 (Two) Times a Day.  Dispense: 100 tablet; Refill: 5  -     XR Abdomen KUB; Future           Follow Up:   Return in about 6 months (around 4/23/2024) for Follow Up after Imaging.    I spent approximately 20 minutes providing clinical care for this patient; including review of patient's chart and provider documentation, face to face time spent with patient in examination room (obtaining history, performing physical exam, discussing diagnosis and management options), placing orders, and completing patient documentation.     Kelby Crum MD  [facility]

## 2023-11-02 DIAGNOSIS — R39.15 URINARY URGENCY: Primary | ICD-10-CM

## 2023-11-02 RX ORDER — OXYBUTYNIN CHLORIDE 10 MG/1
10 TABLET, EXTENDED RELEASE ORAL DAILY
Qty: 30 TABLET | Refills: 2 | Status: SHIPPED | OUTPATIENT
Start: 2023-11-02

## 2023-11-02 NOTE — TELEPHONE ENCOUNTER
Patient placed in Contact isolation for wound on right foot. Patient and wife educated on isolation and questions answered.   Per Dr. Crum's last note, PT would reach out if he wanted another prescription to restart the oxybutynin.

## 2023-12-04 ENCOUNTER — OFFICE VISIT (OUTPATIENT)
Dept: CARDIOLOGY | Facility: CLINIC | Age: 68
End: 2023-12-04
Payer: MEDICARE

## 2023-12-04 VITALS
HEART RATE: 50 BPM | OXYGEN SATURATION: 98 % | BODY MASS INDEX: 28.84 KG/M2 | HEIGHT: 71 IN | WEIGHT: 206 LBS | DIASTOLIC BLOOD PRESSURE: 68 MMHG | SYSTOLIC BLOOD PRESSURE: 120 MMHG

## 2023-12-04 DIAGNOSIS — I48.0 PAROXYSMAL ATRIAL FIBRILLATION: Primary | ICD-10-CM

## 2023-12-04 DIAGNOSIS — I10 PRIMARY HYPERTENSION: ICD-10-CM

## 2023-12-04 DIAGNOSIS — Z95.818 PRESENCE OF WATCHMAN LEFT ATRIAL APPENDAGE CLOSURE DEVICE: ICD-10-CM

## 2023-12-04 PROCEDURE — 3078F DIAST BP <80 MM HG: CPT | Performed by: PHYSICIAN ASSISTANT

## 2023-12-04 PROCEDURE — 3074F SYST BP LT 130 MM HG: CPT | Performed by: PHYSICIAN ASSISTANT

## 2023-12-04 PROCEDURE — 1160F RVW MEDS BY RX/DR IN RCRD: CPT | Performed by: PHYSICIAN ASSISTANT

## 2023-12-04 PROCEDURE — 1159F MED LIST DOCD IN RCRD: CPT | Performed by: PHYSICIAN ASSISTANT

## 2023-12-04 PROCEDURE — 99213 OFFICE O/P EST LOW 20 MIN: CPT | Performed by: PHYSICIAN ASSISTANT

## 2023-12-04 RX ORDER — METOPROLOL SUCCINATE 25 MG/1
25 TABLET, EXTENDED RELEASE ORAL DAILY
Qty: 90 TABLET | Refills: 3 | Status: SHIPPED | OUTPATIENT
Start: 2023-12-04

## 2023-12-04 NOTE — PROGRESS NOTES
Encounter Date:12/04/2023      Patient ID: Justus Gaitan is a 68 y.o. male.    Kelby Kidd MD    Cheif Complaint EP: Atrial Fibrillation    PROBLEM LIST:  Patient Active Problem List    Diagnosis Date Noted    Atrial fibrillation 04/01/2021     Priority: High     Note Last Updated: 8/10/2022     Afib/ A flutter- required manual cardioversion April 2021  Echo, 3/10/2022: Normal LV, EF 65%.  Left atrium normal size.  Normal valvular morphology.  Left atrial appendage closure with 27 mm Watchman FLX device, 8-9-22      Presence of Watchman left atrial appendage closure device 09/28/2022     Priority: Medium    Coronary artery disease      Priority: Medium     Note Last Updated: 6/13/2022     [facility], 3/8/2017: RICHARD to RCA, EF 50%  [facility], 3/29/2022: Normal EF, no critical obstructive CAD      Hypertension      Priority: Low    Kidney stones      Priority: Low    Benign prostatic hyperplasia with urinary frequency 01/03/2023    Hyperlipidemia     GERD (gastroesophageal reflux disease)     Diverticulosis                History of Present Illness  Patient presents today for follow-up with a history of paroxysmal atrial fibrillation with a left atrial appendage occlusion device and hypertension.  Returns today for scheduled electrophysiology follow-up.  He has had no awareness of palpitations, no dizziness no syncope.  His blood pressure at home typically runs 120s 130s on his current medical regimen.  His post watchman MANDIE showed a well-positioned device with normal LV systolic function and normal valvular morphology.  He states compliance with his current medical regimen reports no significant adverse side effects.    Allergies   Allergen Reactions    Sulfa Antibiotics Rash       Current Outpatient Medications   Medication Instructions    APPLE CIDER VINEGAR  mg, Oral, Daily    aspirin 81 mg, Oral, Daily    atorvastatin (LIPITOR) 40 mg, Oral, Nightly    finasteride (PROSCAR) 5 mg, Oral, Daily    fish  "oil 2,000 mg, Oral, Daily With Breakfast    metoprolol succinate XL (TOPROL-XL) 25 mg, Oral, Daily    multivitamin with minerals tablet tablet 1 tablet, Oral, Daily    oxybutynin XL (DITROPAN-XL) 10 mg, Oral, Daily    pantoprazole (PROTONIX) 40 mg, Oral, Daily    potassium citrate (UROCIT-K) 10 MEQ (1080 MG) CR tablet 10 mEq, Oral, 2 Times Daily    tadalafil (CIALIS) 10 MG tablet TAKE 1 TABLET BY MOUTH ONCE DAILY FOR ERECTILE DYSFUNCTION    vitamin B-12 (CYANOCOBALAMIN) 500 mcg, Oral, Daily    vitamin D3 5,000 Units, Oral, Daily    Wheat Dextrin (BENEFIBER PO) 1 dose, Oral, As Needed       .    Objective:     /68 (BP Location: Left arm, Patient Position: Sitting, Cuff Size: Adult)   Pulse 50   Ht 180.3 cm (71\")   Wt 93.4 kg (206 lb)   SpO2 98%   BMI 28.73 kg/m²    Body mass index is 28.73 kg/m².     Constitutional:       Appearance: Well-developed.   Pulmonary:      Effort: Pulmonary effort is normal. No respiratory distress.      Breath sounds: Normal breath sounds. No wheezing. No rales.      Comments: Bases clear  Chest:      Chest wall: Not tender to palpatation.   Cardiovascular:      Normal rate. Regular rhythm.      Murmurs: There is no murmur.      No gallop.  No click. No rub.   Pulses:     Intact distal pulses.   Edema:     Peripheral edema absent.   Musculoskeletal: Normal range of motion.       Lab Review:     Lab Results   Component Value Date    GLUCOSE 103 (H) 02/13/2023    BUN 17 02/13/2023    CREATININE 0.94 02/13/2023    EGFR 89.4 02/13/2023    BCR 18.1 02/13/2023    K 4.9 02/13/2023    CO2 29.0 02/13/2023    CALCIUM 9.9 02/13/2023    ALBUMIN 4.70 04/13/2021    BILITOT 0.7 04/13/2021    AST 34 04/13/2021    ALT 32 04/13/2021     Lab Results   Component Value Date    WBC 7.41 02/13/2023    HGB 15.7 02/13/2023    HCT 46.4 02/13/2023    MCV 96.1 02/13/2023     02/13/2023           Procedures               Assessment:      Diagnosis Plan   1. Paroxysmal atrial fibrillation  " Maintaining sinus rhythm, continue current medical regimen      2. Presence of Watchman left atrial appendage closure device  Left atrial appendage occlusion device in situ.  No indication for anticoagulation      3. Primary hypertension  Well-managed, refilled metoprolol at current dose for 1 year        Plan:     Stable cardiac status.  Continue current medications.   in 1 year, sooner as needed.  Thank you for allowing us to participate in the care of your patient.     Electronically signed by LITA Milligan, 12/04/23, 3:49 PM EST.

## 2024-01-20 DIAGNOSIS — R39.15 URINARY URGENCY: ICD-10-CM

## 2024-01-22 RX ORDER — OXYBUTYNIN CHLORIDE 10 MG/1
10 TABLET, EXTENDED RELEASE ORAL DAILY
Qty: 30 TABLET | Refills: 0 | Status: SHIPPED | OUTPATIENT
Start: 2024-01-22

## 2024-01-22 NOTE — TELEPHONE ENCOUNTER
Rx Refill Note  Requested Prescriptions     Pending Prescriptions Disp Refills    oxybutynin XL (DITROPAN-XL) 10 MG 24 hr tablet [Pharmacy Med Name: Oxybutynin Chloride ER 10 MG Oral Tablet Extended Release 24 Hour] 30 tablet 0     Sig: Take 1 tablet by mouth once daily      Last office visit with prescribing clinician: 10/23/2023  Next office visit with prescribing clinician: 04/25/2024      Sydnie Pastrana MA  01/22/24, 08:11 EST

## 2024-02-16 DIAGNOSIS — R39.15 URINARY URGENCY: ICD-10-CM

## 2024-02-16 RX ORDER — OXYBUTYNIN CHLORIDE 10 MG/1
10 TABLET, EXTENDED RELEASE ORAL DAILY
Qty: 30 TABLET | Refills: 0 | Status: SHIPPED | OUTPATIENT
Start: 2024-02-16

## 2024-02-27 ENCOUNTER — TELEPHONE (OUTPATIENT)
Dept: UROLOGY | Facility: CLINIC | Age: 69
End: 2024-02-27
Payer: MEDICARE

## 2024-02-27 NOTE — TELEPHONE ENCOUNTER
"Relay     \"Left VM to Reschedule PT 4-25 appt. Provider will be out of the office\"                 "

## 2024-03-15 ENCOUNTER — TELEPHONE (OUTPATIENT)
Dept: UROLOGY | Facility: CLINIC | Age: 69
End: 2024-03-15
Payer: MEDICARE

## 2024-03-15 DIAGNOSIS — R39.15 URINARY URGENCY: ICD-10-CM

## 2024-03-15 RX ORDER — OXYBUTYNIN CHLORIDE 10 MG/1
10 TABLET, EXTENDED RELEASE ORAL DAILY
Qty: 30 TABLET | Refills: 0 | Status: SHIPPED | OUTPATIENT
Start: 2024-03-15

## 2024-03-15 NOTE — TELEPHONE ENCOUNTER
Rx Refill Note  Requested Prescriptions     Pending Prescriptions Disp Refills    oxybutynin XL (DITROPAN-XL) 10 MG 24 hr tablet [Pharmacy Med Name: Oxybutynin Chloride ER 10 MG Oral Tablet Extended Release 24 Hour] 30 tablet 0     Sig: Take 1 tablet by mouth once daily      Last office visit with prescribing clinician: 10/23/2023   Next office visit with prescribing clinician: 4/25/2024       Sydnie Pastrana MA  03/15/24, 10:15 EDT

## 2024-03-15 NOTE — TELEPHONE ENCOUNTER
"Relay     \"Dr Crum will be out of the office on upcoming appt 4-25. Please reschedule next available \"                 "

## 2024-04-09 ENCOUNTER — TELEPHONE (OUTPATIENT)
Dept: CARDIOLOGY | Facility: CLINIC | Age: 69
End: 2024-04-09
Payer: MEDICARE

## 2024-04-15 DIAGNOSIS — R39.15 URINARY URGENCY: ICD-10-CM

## 2024-04-15 RX ORDER — OXYBUTYNIN CHLORIDE 10 MG/1
10 TABLET, EXTENDED RELEASE ORAL DAILY
Qty: 30 TABLET | Refills: 0 | Status: SHIPPED | OUTPATIENT
Start: 2024-04-15

## 2024-04-15 NOTE — TELEPHONE ENCOUNTER
Rx Refill Note  Requested Prescriptions     Pending Prescriptions Disp Refills    oxybutynin XL (DITROPAN-XL) 10 MG 24 hr tablet [Pharmacy Med Name: Oxybutynin Chloride ER 10 MG Oral Tablet Extended Release 24 Hour] 30 tablet 0     Sig: Take 1 tablet by mouth once daily      Last office visit with prescribing clinician: 10/23/2023   Last telemedicine visit with prescribing clinician: Visit date not found   Next office visit with prescribing clinician: 5/1/2024       Sarahy Moura MA  04/15/24, 08:16 EDT

## 2024-04-29 ENCOUNTER — HOSPITAL ENCOUNTER (OUTPATIENT)
Dept: GENERAL RADIOLOGY | Facility: HOSPITAL | Age: 69
Discharge: HOME | End: 2024-04-29
Admitting: STUDENT IN AN ORGANIZED HEALTH CARE EDUCATION/TRAINING PROGRAM
Payer: MEDICARE

## 2024-04-29 DIAGNOSIS — N20.0 NEPHROLITHIASIS: ICD-10-CM

## 2024-04-29 PROCEDURE — 74018 RADEX ABDOMEN 1 VIEW: CPT

## 2024-05-01 ENCOUNTER — OFFICE VISIT (OUTPATIENT)
Dept: UROLOGY | Facility: CLINIC | Age: 69
End: 2024-05-01
Payer: MEDICARE

## 2024-05-01 VITALS
HEART RATE: 57 BPM | OXYGEN SATURATION: 95 % | BODY MASS INDEX: 27.85 KG/M2 | HEIGHT: 72 IN | DIASTOLIC BLOOD PRESSURE: 78 MMHG | WEIGHT: 205.6 LBS | SYSTOLIC BLOOD PRESSURE: 139 MMHG

## 2024-05-01 DIAGNOSIS — R35.0 BENIGN PROSTATIC HYPERPLASIA WITH URINARY FREQUENCY: ICD-10-CM

## 2024-05-01 DIAGNOSIS — N20.0 KIDNEY STONES: Primary | ICD-10-CM

## 2024-05-01 DIAGNOSIS — N20.0 NEPHROLITHIASIS: ICD-10-CM

## 2024-05-01 DIAGNOSIS — N40.1 BPH WITH OBSTRUCTION/LOWER URINARY TRACT SYMPTOMS: ICD-10-CM

## 2024-05-01 DIAGNOSIS — N13.8 BPH WITH OBSTRUCTION/LOWER URINARY TRACT SYMPTOMS: ICD-10-CM

## 2024-05-01 DIAGNOSIS — N40.1 BENIGN PROSTATIC HYPERPLASIA WITH URINARY FREQUENCY: ICD-10-CM

## 2024-05-01 RX ORDER — FINASTERIDE 5 MG/1
5 TABLET, FILM COATED ORAL DAILY
Qty: 90 TABLET | Refills: 3 | Status: SHIPPED | OUTPATIENT
Start: 2024-05-01

## 2024-05-01 RX ORDER — POTASSIUM CITRATE 10 MEQ/1
10 TABLET, EXTENDED RELEASE ORAL 2 TIMES DAILY
Qty: 180 TABLET | Refills: 3 | Status: SHIPPED | OUTPATIENT
Start: 2024-05-01 | End: 2025-05-01

## 2024-05-01 NOTE — PROGRESS NOTES
Follow Up Office Visit      Patient Name: Justus Gaitan  : 1955   MRN: [id number]     Chief Complaint:    Chief Complaint   Patient presents with    Benign prostatic hyperplasia with urinary frequency       History of Present Illness: Justus Gaitan is a 68 y.o. male who presents today for follow up of BPH with LUTS s/p Greenlight PVP , recurrent nephrolithiasis on Kcit.     Patient was last seen in my clinic in 2023. Reported a strong stream, some mild urgency which he has been managing with intermittent oxybutynin 10 mg extended release tablets, he is still taking this intermittently.  IPSS stable, still 6, doing great. He is remaining on finasteride to prevent prostatic regrowth.  PSA was recently very low and normal range, not due again until October.    He underwent a CT abdomen pelvis without contrast on 10/9/2023 demonstrating 2 separate stones in the left midpole roughly 3 and 4 mm for a total of 7 mm stone burden.  He has additional tiny 1 mm nonobstructing stones on each kidney.  He has a history of complex cyst and these are not evaluated very well on this imaging but nothing concerning is noted. KUB prior to today's visit reveals stable stone burden barely visible in the left side.     Lab Results   Component Value Date    PSA 0.057 10/17/2023       Subjective      Review of System: Review of Systems   Genitourinary: Negative.       I have reviewed the ROS documented by my clinical staff, I have updated appropriately and I agree. Kelby Crum MD    I have reviewed and the following portions of the patient's history were updated as appropriate: past family history, past medical history, past social history, past surgical history and problem list.    Medications:     Current Outpatient Medications:     APPLE CIDER VINEGAR PO, Take 450 mg by mouth Daily., Disp: , Rfl:     aspirin 81 MG EC tablet, Take 1 tablet by mouth Daily., Disp: , Rfl:     atorvastatin  (LIPITOR) 40 MG tablet, Take 1 tablet by mouth Every Night., Disp: , Rfl:     finasteride (PROSCAR) 5 MG tablet, Take 1 tablet by mouth Daily., Disp: 90 tablet, Rfl: 3    metoprolol succinate XL (TOPROL-XL) 25 MG 24 hr tablet, Take 1 tablet by mouth Daily., Disp: 90 tablet, Rfl: 3    multivitamin with minerals tablet tablet, Take 1 tablet by mouth Daily., Disp: , Rfl:     Omega-3 Fatty Acids (fish oil) 1000 MG capsule capsule, Take 2 capsules by mouth Daily With Breakfast., Disp: , Rfl:     oxybutynin XL (DITROPAN-XL) 10 MG 24 hr tablet, Take 1 tablet by mouth once daily, Disp: 30 tablet, Rfl: 0    pantoprazole (PROTONIX) 40 MG EC tablet, Take 1 tablet by mouth Daily., Disp: , Rfl:     potassium citrate (UROCIT-K) 10 MEQ (1080 MG) CR tablet, Take 1 tablet by mouth 2 (Two) Times a Day., Disp: 180 tablet, Rfl: 3    tadalafil (CIALIS) 10 MG tablet, TAKE 1 TABLET BY MOUTH ONCE DAILY FOR ERECTILE DYSFUNCTION, Disp: , Rfl:     vitamin B-12 (CYANOCOBALAMIN) 500 MCG tablet, Take 1 tablet by mouth Daily., Disp: , Rfl:     vitamin D3 125 MCG (5000 UT) capsule capsule, Take 1 capsule by mouth Daily., Disp: , Rfl:     Wheat Dextrin (BENEFIBER PO), Take 1 dose by mouth As Needed., Disp: , Rfl:     Allergies:   Allergies   Allergen Reactions    Sulfa Antibiotics Rash       IPSS Questionnaire (AUA-7):  Over the past month…    1)  Incomplete Emptying:       How often have you had a sensation of not emptying you had the sensation of not emptying your bladder completely after you finished urinating?  0 - Not at all   2)  Frequency:       How often have you had the urinate again less than two hours after you finished urinating?  1 - Less than 1 time in 5   3)  Intermittency:       How often have you found you stopped and started again several times when you urinated?   1 - Less than 1 time in 5   4) Urgency:      How often have you found it difficult to postpone urination?  1 - Less than 1 time in 5   5) Weak Stream:      How often  "have you had a weak urinary stream?  1 - Less than 1 time in 5   6) Straining:       How often have you had to push or strain to begin urination?  0 - Not at all   7) Nocturia:      How many times did you most typically get up to urinate from the time you went to bed at night until the time you got up in the morning?  2 - 2 times   Total Score:  6   The International Prostate Symptom Score (IPSS) is used to screen, diagnose, track symptoms of benign prostatic hyperplasia (BPH).   0-7 (Mild Symptoms) 8-19 (Moderate) 20-35 (Severe)   Quality of Life (QoL):  If you were to spend the rest of your life with your urinary condition just the way it is now, how would you feel about that? 2-Mostly Satisfied   Urine Leakage (Incontinence) 0-No Leakage         Objective     Physical Exam:   Vital Signs:   Vitals:    05/01/24 1100   BP: 139/78   Pulse: 57   SpO2: 95%   Weight: 93.3 kg (205 lb 9.6 oz)   Height: 181.6 cm (71.5\")     Body mass index is 28.28 kg/m².     Physical Exam  Constitutional:       Appearance: Normal appearance.   HENT:      Head: Normocephalic and atraumatic.      Nose: Nose normal.   Eyes:      Extraocular Movements: Extraocular movements intact.      Conjunctiva/sclera: Conjunctivae normal.      Pupils: Pupils are equal, round, and reactive to light.   Musculoskeletal:         General: Normal range of motion.      Cervical back: Normal range of motion and neck supple.   Skin:     General: Skin is warm and dry.      Findings: No lesion or rash.   Neurological:      General: No focal deficit present.      Mental Status: He is alert and oriented to person, place, and time. Mental status is at baseline.   Psychiatric:         Mood and Affect: Mood normal.         Behavior: Behavior normal.         Labs:   Brief Urine Lab Results  (Last result in the past 365 days)        Color   Clarity   Blood   Leuk Est   Nitrite   Protein   CREAT   Urine HCG        10/23/23 0957 Yellow   Clear   Trace   Negative   " Negative   Negative                        Lab Results   Component Value Date    GLUCOSE 103 (H) 02/13/2023    CALCIUM 9.9 02/13/2023     02/13/2023    K 4.9 02/13/2023    CO2 29.0 02/13/2023     02/13/2023    BUN 17 02/13/2023    CREATININE 0.94 02/13/2023    EGFRIFNONA 55 (L) 04/13/2021    BCR 18.1 02/13/2023    ANIONGAP 8.0 02/13/2023       Lab Results   Component Value Date    WBC 7.41 02/13/2023    HGB 15.7 02/13/2023    HCT 46.4 02/13/2023    MCV 96.1 02/13/2023     02/13/2023       Images:   XR Abdomen KUB    Result Date: 4/29/2024  Impression: 1. Stable intrarenal calculi. No definite new stones identified. Electronically Signed: Richy Costa MD  4/29/2024 8:05 PM EDT  Workstation ID: [id number]      Measures:   Tobacco:   Justus Gaitan  reports that he quit smoking about 46 years ago. His smoking use included cigarettes. He started smoking about 49 years ago. He has a 0.8 pack-year smoking history. He has been exposed to tobacco smoke. He has never used smokeless tobacco.     Assessment / Plan      Assessment/Plan:   68 y.o. male who presents today for follow up of BPH with LUTS s/p Greenlight PVP 02/23, recurrent nephrolithiasis on Kcit.  Doing well, small left-sided kidney stones stable on KUB.  We will repeat a KUB in 1 year to assess for growth, he defers elective stone intervention.  He will discontinue the oxybutynin to determine if any benefit from the medication.  If he needs this I will refill this for him he will message me on MyChart.  Continue finasteride to prevent prostatic regrowth.  PSA very low we will repeat this prior to 1 year follow-up.    Diagnoses and all orders for this visit:    1. Kidney stones (Primary)    2. Benign prostatic hyperplasia with urinary frequency  -     PSA Diagnostic; Future    3. Nephrolithiasis  -     potassium citrate (UROCIT-K) 10 MEQ (1080 MG) CR tablet; Take 1 tablet by mouth 2 (Two) Times a Day.  Dispense: 180 tablet; Refill: 3  -      XR Abdomen KUB; Future    4. BPH with obstruction/lower urinary tract symptoms  -     finasteride (PROSCAR) 5 MG tablet; Take 1 tablet by mouth Daily.  Dispense: 90 tablet; Refill: 3           Follow Up:   Return in about 1 year (around 5/1/2025) for Follow Up after Imaging.    I spent approximately 30 minutes providing clinical care for this patient; including review of patient's chart and provider documentation, face to face time spent with patient in examination room (obtaining history, performing physical exam, discussing diagnosis and management options), placing orders, and completing patient documentation.     Kelby Crum MD  [facility]

## 2024-12-09 ENCOUNTER — OFFICE VISIT (OUTPATIENT)
Dept: CARDIOLOGY | Facility: CLINIC | Age: 69
End: 2024-12-09
Payer: MEDICARE

## 2024-12-09 VITALS
DIASTOLIC BLOOD PRESSURE: 74 MMHG | OXYGEN SATURATION: 96 % | HEART RATE: 62 BPM | WEIGHT: 209.8 LBS | BODY MASS INDEX: 29.37 KG/M2 | HEIGHT: 71 IN | SYSTOLIC BLOOD PRESSURE: 132 MMHG

## 2024-12-09 DIAGNOSIS — I48.0 PAROXYSMAL ATRIAL FIBRILLATION: Primary | ICD-10-CM

## 2024-12-09 PROCEDURE — 93000 ELECTROCARDIOGRAM COMPLETE: CPT | Performed by: PHYSICIAN ASSISTANT

## 2024-12-09 PROCEDURE — 3078F DIAST BP <80 MM HG: CPT | Performed by: PHYSICIAN ASSISTANT

## 2024-12-09 PROCEDURE — 3075F SYST BP GE 130 - 139MM HG: CPT | Performed by: PHYSICIAN ASSISTANT

## 2024-12-09 PROCEDURE — 99214 OFFICE O/P EST MOD 30 MIN: CPT | Performed by: PHYSICIAN ASSISTANT

## 2024-12-09 NOTE — PROGRESS NOTES
"            Patient ID: Justus Gaitan is a 69 y.o. male.    Kelby Kidd MD    Cheif Complaint EP: Atrial Fibrillation    PROBLEM LIST:  Patient Active Problem List    Diagnosis Date Noted    Atrial fibrillation 04/01/2021     Priority: High     Note Last Updated: 8/10/2022     Afib/ A flutter- required manual cardioversion April 2021  Echo, 3/10/2022: Normal LV, EF 65%.  Left atrium normal size.  Normal valvular morphology.  Left atrial appendage closure with 27 mm Watchman FLX device, 8-9-22      Benign prostatic hyperplasia with urinary frequency 01/03/2023    Presence of Watchman left atrial appendage closure device 09/28/2022    Coronary artery disease      Note Last Updated: 6/13/2022     [facility], 3/8/2017: RICHARD to RCA, EF 50%  [facility], 3/29/2022: Normal EF, no critical obstructive CAD      Hyperlipidemia     Hypertension     GERD (gastroesophageal reflux disease)     Kidney stones     Diverticulosis                History of Present Illness  This is a pleasant 69 year old presents for follow up regarding afib . He denies any recurrent episodes of Afib in the past year. He denies any chest pain, dizziness, near syncope or syncope. He check in with his PCP on a regular basis. He states his bp is controlled most of the time.    Allergies   Allergen Reactions    Sulfa Antibiotics Rash           .    Objective:     /74 (BP Location: Left arm, Patient Position: Sitting, Cuff Size: Adult)   Pulse 62   Ht 180.3 cm (71\")   Wt 95.2 kg (209 lb 12.8 oz)   SpO2 96%   BMI 29.26 kg/m²    Body mass index is 29.26 kg/m².     Constitutional:       Appearance: Well-developed.   Pulmonary:      Effort: Pulmonary effort is normal. No respiratory distress.      Breath sounds: Normal breath sounds. No wheezing. No rales.      Comments: Bases clear  Chest:      Chest wall: Not tender to palpatation.   Cardiovascular:      Normal rate. Regular rhythm.      Murmurs: There is no murmur.      No gallop.  No click. No rub. "   Pulses:     Intact distal pulses.   Edema:     Peripheral edema absent.   Musculoskeletal: Normal range of motion.       Lab Review:     Lab Results   Component Value Date    GLUCOSE 103 (H) 02/13/2023    BUN 17 02/13/2023    CREATININE 0.94 02/13/2023    EGFR 89.4 02/13/2023    BCR 18.1 02/13/2023    K 4.9 02/13/2023    CO2 29.0 02/13/2023    CALCIUM 9.9 02/13/2023    ALBUMIN 4.70 04/13/2021    BILITOT 0.7 04/13/2021    AST 34 04/13/2021    ALT 32 04/13/2021     Lab Results   Component Value Date    WBC 7.41 02/13/2023    HGB 15.7 02/13/2023    HCT 46.4 02/13/2023    MCV 96.1 02/13/2023     02/13/2023             ECG 12 Lead    Date/Time: 12/9/2024 11:54 AM  Performed by: Aidan Singletary PA    Authorized by: Aidan Singletary PA  Comparison: compared with previous ECG from 6/13/2022  Rhythm: sinus rhythm  Rate: normal  Conduction: conduction normal  ST Segments: ST segments normal  Other: no other findings    Clinical impression: normal ECG                     Assessment:      Diagnosis Plan   1. Paroxysmal atrial fibrillation  Maintaining sinus rhythm, continue current medical regimen. Last episode of Afib 2021.       2. Presence of Watchman left atrial appendage closure device  Left atrial appendage occlusion device in situ. 2022. No indication for anticoagulation. Asa daily.      3. Primary hypertension  Patient reports controlled on current medications.    4. CAD: RCA stent, Followed by Dr. Cesar.      Plan:     No afib, continue risk factor modification. Continue to follow up with Dr. Cesar. Follow up as needed.   Electronically signed by LITA Santizo, 12/09/24, 11:57 AM EST.

## 2025-04-30 ENCOUNTER — TELEPHONE (OUTPATIENT)
Dept: UROLOGY | Facility: CLINIC | Age: 70
End: 2025-04-30
Payer: MEDICARE

## 2025-04-30 NOTE — TELEPHONE ENCOUNTER
Called and spoke with pt to let him know that per Dr. Crum, if he does his Xray first thing in the morning he will be okay to be seen at his afternoon appointment.

## 2025-04-30 NOTE — TELEPHONE ENCOUNTER
Caller: BOLA LOZANO    Relationship: SELF    Best call back number: [phone]    What orders are you requesting (i.e. lab or imaging): KUB    In what timeframe would the patient need to come in: PATIENT WOULD LIKE TO HAVE THIS DONE TODAY. ORDER SAYS MAY 1, PLEASE UPDATE ORDER TO TODAY'S DATE.    Where will you receive your lab/imaging services:  FACILITY    Additional notes: PLEASE CALL THE PATIENT ONCE ORDER UPDATED SO HE CAN GO AHEAD AND HAVE KUB DONE        ”

## 2025-04-30 NOTE — TELEPHONE ENCOUNTER
Called and spoke with pt. Per Dr. Crum, if the pt does his Xray in the morning he should be fine for his afternoon appointment.

## 2025-04-30 NOTE — TELEPHONE ENCOUNTER
Caller: BOLA LOZANO    Relationship to patient: SELF    Best call back number: [phone]     Patient is needing: PT CAN GET KUB IN THE MORNING AND MAKE 5/1/25 APPT PLEASE HAVE SUN TOUCH BASE WITH PT TO ENSURE THIS WILL BE OK

## 2025-05-01 ENCOUNTER — OFFICE VISIT (OUTPATIENT)
Dept: UROLOGY | Facility: CLINIC | Age: 70
End: 2025-05-01
Payer: MEDICARE

## 2025-05-01 ENCOUNTER — LAB (OUTPATIENT)
Dept: LAB | Facility: HOSPITAL | Age: 70
End: 2025-05-01
Payer: MEDICARE

## 2025-05-01 ENCOUNTER — HOSPITAL ENCOUNTER (OUTPATIENT)
Dept: GENERAL RADIOLOGY | Facility: HOSPITAL | Age: 70
Discharge: HOME | End: 2025-05-01
Payer: MEDICARE

## 2025-05-01 VITALS — BODY MASS INDEX: 29.12 KG/M2 | HEIGHT: 71 IN | WEIGHT: 208 LBS

## 2025-05-01 DIAGNOSIS — N13.8 BPH WITH OBSTRUCTION/LOWER URINARY TRACT SYMPTOMS: ICD-10-CM

## 2025-05-01 DIAGNOSIS — N40.1 BPH WITH OBSTRUCTION/LOWER URINARY TRACT SYMPTOMS: ICD-10-CM

## 2025-05-01 DIAGNOSIS — N40.1 BENIGN PROSTATIC HYPERPLASIA WITH URINARY FREQUENCY: ICD-10-CM

## 2025-05-01 DIAGNOSIS — R35.0 BENIGN PROSTATIC HYPERPLASIA WITH URINARY FREQUENCY: ICD-10-CM

## 2025-05-01 DIAGNOSIS — N20.0 NEPHROLITHIASIS: ICD-10-CM

## 2025-05-01 PROCEDURE — 36415 COLL VENOUS BLD VENIPUNCTURE: CPT

## 2025-05-01 PROCEDURE — 1160F RVW MEDS BY RX/DR IN RCRD: CPT | Performed by: STUDENT IN AN ORGANIZED HEALTH CARE EDUCATION/TRAINING PROGRAM

## 2025-05-01 PROCEDURE — 1159F MED LIST DOCD IN RCRD: CPT | Performed by: STUDENT IN AN ORGANIZED HEALTH CARE EDUCATION/TRAINING PROGRAM

## 2025-05-01 PROCEDURE — 74018 RADEX ABDOMEN 1 VIEW: CPT

## 2025-05-01 PROCEDURE — 99214 OFFICE O/P EST MOD 30 MIN: CPT | Performed by: STUDENT IN AN ORGANIZED HEALTH CARE EDUCATION/TRAINING PROGRAM

## 2025-05-01 PROCEDURE — 84153 ASSAY OF PSA TOTAL: CPT

## 2025-05-01 RX ORDER — ALBUTEROL SULFATE 90 UG/1
AEROSOL, METERED RESPIRATORY (INHALATION)
COMMUNITY
Start: 2025-02-12

## 2025-05-01 RX ORDER — FINASTERIDE 5 MG/1
5 TABLET, FILM COATED ORAL DAILY
Qty: 90 TABLET | Refills: 3 | Status: SHIPPED | OUTPATIENT
Start: 2025-05-01

## 2025-05-01 RX ORDER — POTASSIUM CITRATE 1080 MG/1
10 TABLET, EXTENDED RELEASE ORAL 2 TIMES DAILY
Qty: 180 TABLET | Refills: 3 | Status: SHIPPED | OUTPATIENT
Start: 2025-05-01 | End: 2026-05-01

## 2025-05-01 NOTE — PROGRESS NOTES
Follow Up Office Visit      Patient Name: Justus Gaitan  : 1955   MRN: [id number]     Chief Complaint:    Chief Complaint   Patient presents with    Kidney stones       Referring Provider: No ref. provider found    History of Present Illness: Justus Gaitan is a 69 y.o. male who presents today for follow up of BPH status post recent PVP in , history of nephrolithiasis.  We are monitoring left-sided kidney stones.  Here today with KUB.  KUB reviewed.  Tiny calcification in the left kidney.  He has not passed a kidney stone or had any stone type symptoms since I have seen him 1 year prior.  He states he used to pass multiple stones on a yearly basis.  He has also been initiating potassium citrate 10 mEq twice daily.  No stone episodes since starting potassium citrate.  Doing very well.  Trying to stay hydrated.  States he still eats quite a bit of salt which he could work on.  He manages BPH growth prevention with finasteride daily.  We will continue this.  He is off alpha blockers after greenlight PVP.  Reports a strong stream.  IPSS score 6.  Have not completed PSA since a few years prior, he is due for PSA today.    Subjective      Review of System: Review of Systems   Genitourinary:  Negative for decreased urine volume, difficulty urinating, dysuria, enuresis, flank pain, frequency, hematuria and urgency.      I have reviewed the ROS documented by my clinical staff, I have updated appropriately and I agree. Kelby Crum MD    I have reviewed and the following portions of the patient's history were updated as appropriate: past family history, past medical history, past social history, past surgical history and problem list.    Medications:     Current Outpatient Medications:     APPLE CIDER VINEGAR PO, Take 450 mg by mouth Daily., Disp: , Rfl:     aspirin 81 MG EC tablet, Take 1 tablet by mouth Daily., Disp: , Rfl:     atorvastatin (LIPITOR) 40 MG tablet, Take 1 tablet by mouth  Every Night., Disp: , Rfl:     finasteride (PROSCAR) 5 MG tablet, Take 1 tablet by mouth Daily., Disp: 90 tablet, Rfl: 3    metoprolol succinate XL (TOPROL-XL) 25 MG 24 hr tablet, Take 1 tablet by mouth Daily., Disp: 90 tablet, Rfl: 3    multivitamin with minerals tablet tablet, Take 1 tablet by mouth Daily., Disp: , Rfl:     Omega-3 Fatty Acids (fish oil) 1000 MG capsule capsule, Take 2 capsules by mouth Daily With Breakfast. (Patient taking differently: Take 1 capsule by mouth Every Night.), Disp: , Rfl:     pantoprazole (PROTONIX) 40 MG EC tablet, Take 1 tablet by mouth Daily., Disp: , Rfl:     potassium citrate (UROCIT-K) 10 MEQ (1080 MG) CR tablet, Take 1 tablet by mouth 2 (Two) Times a Day., Disp: 180 tablet, Rfl: 3    tadalafil (CIALIS) 10 MG tablet, TAKE 1 TABLET BY MOUTH ONCE DAILY FOR ERECTILE DYSFUNCTION (Patient taking differently: Take 1 tablet by mouth Daily As Needed for Erectile Dysfunction.), Disp: , Rfl:     Ventolin  (90 Base) MCG/ACT inhaler, INHALE 2 PUFFS BY MOUTH EVERY 4 TO 6 HOURS AS NEEDED FOR SHORTNESS OF BREATH, Disp: , Rfl:     vitamin B-12 (CYANOCOBALAMIN) 500 MCG tablet, Take 1 tablet by mouth Daily., Disp: , Rfl:     vitamin D3 125 MCG (5000 UT) capsule capsule, Take 1 capsule by mouth Daily., Disp: , Rfl:     Wheat Dextrin (BENEFIBER PO), Take 1 dose by mouth As Needed., Disp: , Rfl:     Allergies:   Allergies   Allergen Reactions    Sulfa Antibiotics Rash       IPSS Questionnaire (AUA-7):  Over the past month…    1)  Incomplete Emptying:       How often have you had a sensation of not emptying you had the sensation of not emptying your bladder completely after you finished urinating?  1 - Less than 1 time in 5   2)  Frequency:       How often have you had the urinate again less than two hours after you finished urinating?  2 - Less than half the time   3)  Intermittency:       How often have you found you stopped and started again several times when you urinated?   0 - Not at  "all   4) Urgency:      How often have you found it difficult to postpone urination?  1 - Less than 1 time in 5   5) Weak Stream:      How often have you had a weak urinary stream?  0 - Not at all   6) Straining:       How often have you had to push or strain to begin urination?  0 - Not at all   7) Nocturia:      How many times did you most typically get up to urinate from the time you went to bed at night until the time you got up in the morning?  2 - 2 times   Total Score:  6   The International Prostate Symptom Score (IPSS) is used to screen, diagnose, track symptoms of benign prostatic hyperplasia (BPH).   0-7 (Mild Symptoms) 8-19 (Moderate) 20-35 (Severe)   Quality of Life (QoL):  If you were to spend the rest of your life with your urinary condition just the way it is now, how would you feel about that? 2-Mostly Satisfied   Urine Leakage (Incontinence) 0-No Leakage       Objective     Physical Exam:   Vital Signs:   Vitals:    05/01/25 1404   Weight: 94.3 kg (208 lb)   Height: 180.3 cm (70.98\")     Body mass index is 29.02 kg/m².     Physical Exam  Constitutional:       Appearance: Normal appearance.   HENT:      Head: Normocephalic and atraumatic.      Nose: Nose normal.   Eyes:      Extraocular Movements: Extraocular movements intact.      Conjunctiva/sclera: Conjunctivae normal.      Pupils: Pupils are equal, round, and reactive to light.   Musculoskeletal:         General: Normal range of motion.      Cervical back: Normal range of motion and neck supple.   Skin:     General: Skin is warm and dry.      Findings: No lesion or rash.   Neurological:      General: No focal deficit present.      Mental Status: He is alert and oriented to person, place, and time. Mental status is at baseline.   Psychiatric:         Mood and Affect: Mood normal.         Behavior: Behavior normal.         Labs:   Brief Urine Lab Results       None                 Lab Results   Component Value Date    GLUCOSE 103 (H) 02/13/2023    " CALCIUM 9.9 02/13/2023     02/13/2023    K 4.9 02/13/2023    CO2 29.0 02/13/2023     02/13/2023    BUN 17 02/13/2023    CREATININE 0.94 02/13/2023    EGFRIFNONA 55 (L) 04/13/2021    BCR 18.1 02/13/2023    ANIONGAP 8.0 02/13/2023       Lab Results   Component Value Date    WBC 7.41 02/13/2023    HGB 15.7 02/13/2023    HCT 46.4 02/13/2023    MCV 96.1 02/13/2023     02/13/2023       Images:   No Images in the past 120 days found..    Measures:   Tobacco:   Justus Gaitan  reports that he quit smoking about 47 years ago. His smoking use included cigarettes. He started smoking about 50 years ago. He has a 0.8 pack-year smoking history. He has been exposed to tobacco smoke. He has never used smokeless tobacco.     Assessment / Plan      Assessment/Plan:   69 y.o. male who presented today for follow up of recurrent nephrolithiasis, BPH status post greenlight PVP.  Continue finasteride to prevent prostatic regrowth.  Continue potassium citrate for kidney stone prevention.  Reduce sodium, increase water intake.  Follow-up in 1 year with renal ultrasound for stone surveillance.    Diagnoses and all orders for this visit:    1. BPH with obstruction/lower urinary tract symptoms  -     finasteride (PROSCAR) 5 MG tablet; Take 1 tablet by mouth Daily.  Dispense: 90 tablet; Refill: 3    2. Nephrolithiasis  -     potassium citrate (UROCIT-K) 10 MEQ (1080 MG) CR tablet; Take 1 tablet by mouth 2 (Two) Times a Day.  Dispense: 180 tablet; Refill: 3  -     US Renal Bilateral; Future           Follow Up:   Return for Follow Up after Imaging.    I spent approximately 30 minutes providing clinical care for this patient; including review of patient's chart and provider documentation, face to face time spent with patient in examination room (obtaining history, performing physical exam, discussing diagnosis and management options), placing orders, and completing patient documentation.     Kelby Crum MD  [facility] [place]

## 2025-05-02 LAB — PSA SERPL-MCNC: 0.06 NG/ML (ref 0–4)

## (undated) DEVICE — GLIDESHEATH SLENDER STAINLESS STEEL KIT: Brand: GLIDESHEATH SLENDER

## (undated) DEVICE — Device: Brand: MEDEX

## (undated) DEVICE — GLV SURG BIOGEL LTX PF 8

## (undated) DEVICE — KODAMA CATHETER, P/N 701470CONTENTS: IMAGING CATHETER, 3 ML SYRINGE, 10 ML SYRINGE, EXTENSION SET, STERILE BAG, IFU: Brand: ACIST KODAMA® CORONARY IMAGING CATHETER

## (undated) DEVICE — CONTRL CONTRST CHMBRD W/TBG72IN REUS

## (undated) DEVICE — MODEL BT2000 P/N 700287-012KIT CONTENTS: MANIFOLD WITH SALINE AND CONTRAST PORTS, SALINE TUBING WITH SPIKE AND HAND SYRINGE, TRANSDUCER: Brand: BT2000 AUTOMATED MANIFOLD KIT

## (undated) DEVICE — GW SAFARI2 PRESH XSM CRV .035IN 3.2X2.9X275CM

## (undated) DEVICE — SOL NACL 0.9PCT 1000ML

## (undated) DEVICE — GUIDE CATHETER: Brand: MACH1™

## (undated) DEVICE — GOWN,NON-REINFORCED,SIRUS,SET IN SLV,XXL: Brand: MEDLINE

## (undated) DEVICE — ANGIO-SEAL VIP VASCULAR CLOSURE DEVICE: Brand: ANGIO-SEAL

## (undated) DEVICE — ADULT, W/LG. BACK PAD, RADIOTRANSPARENT ELEMENT AND LEAD WIRE: Brand: DEFIBRILLATION ELECTRODES

## (undated) DEVICE — ST EXT IV LG BORE NDLESS FLTR LL 17IN

## (undated) DEVICE — MODEL AT P54, P/N 700608-035KIT CONTENTS: HAND CONTROLLER, 3-WAY HIGH-PRESSURE STOPCOCK WITH ROTATING END AND PREMIUM HIGH-PRESSURE TUBING: Brand: ANGIOTOUCH® KIT

## (undated) DEVICE — CANN NASL CO2 DIVIDED A/

## (undated) DEVICE — RUNTHROUGH NS EXTRA FLOPPY PTCA GUIDEWIRE: Brand: RUNTHROUGH

## (undated) DEVICE — DOME MONITORING W BONDED STPCK BIOTRANS2

## (undated) DEVICE — ST EXT IV SMARTSITE 2VLV SP M LL 5ML IV1

## (undated) DEVICE — CATH DIAG EXPO .045 FL3  5F 100CM

## (undated) DEVICE — KT MANIFLD EP

## (undated) DEVICE — PINNACLE INTRODUCER SHEATH: Brand: PINNACLE

## (undated) DEVICE — LIMB HOLDER, WRIST/ANKLE: Brand: DEROYAL

## (undated) DEVICE — DECANT BG O JET

## (undated) DEVICE — MSK ENDO PORT O2 POM ELITE CURAPLEX A/

## (undated) DEVICE — INTRO SHEATH TRNSEP .032 SL0 8.5F 63CM

## (undated) DEVICE — KT MANIFOLD CATHLAB CUST

## (undated) DEVICE — GW PERIPH VASC ADX J/TP SS .035 150CM 3MM

## (undated) DEVICE — SYS CLS VASC/VENI VASCADE MVP 6TO12F

## (undated) DEVICE — DRAINBAG,ANTI-REFLUX TOWER,L/F,2000ML,LL: Brand: MEDLINE

## (undated) DEVICE — PK CYSTO-TUR BASIC 10

## (undated) DEVICE — ST PRIM GRVTY NDLESS 3 INJ PORT 105IN

## (undated) DEVICE — DRSNG SURESITE123 4X4.8IN

## (undated) DEVICE — CATH DIAG EXPO M/ PK 5F FL4/FR4 PIG

## (undated) DEVICE — ACCESS SHEATH WITH DILATOR: Brand: WATCHMAN FXD CURVE™ ACCESS SYSTEM

## (undated) DEVICE — ST INF PRI SMRTSTE 20DRP 2VLV 24ML 117

## (undated) DEVICE — GW INQWIRE FC PTFE STD J/1.5 .035 260

## (undated) DEVICE — INTRO SHEATH ENGAGE W/50 GW .038 8F12

## (undated) DEVICE — SET PRIMARY GRVTY 10DP MALE LL 104IN

## (undated) DEVICE — DEV INFL MONARCH 25W

## (undated) DEVICE — TUBING, SUCTION, 1/4" X 10', STRAIGHT: Brand: MEDLINE

## (undated) DEVICE — GW TRNSEP SAFESEPT LT/ATRIAL RO 135CM .014IN

## (undated) DEVICE — CATH DIAG EXPO .052 PIG145 6F 110CM

## (undated) DEVICE — MEDI-VAC YANKAUER SUCTION HANDLE W/BULBOUS TIP: Brand: CARDINAL HEALTH

## (undated) DEVICE — CATH FOLEY LUBRICATH 3WY 20F 30CC

## (undated) DEVICE — SYS TRNSEP ACC ACROSS ADLT BRK1 71CM

## (undated) DEVICE — CATH DIAG EXPO M/ PK 6FR FL4/FR4 PIG 3PK

## (undated) DEVICE — LEX ELECTRO PHYSIOLOGY: Brand: MEDLINE INDUSTRIES, INC.

## (undated) DEVICE — PK CATH CARD 10

## (undated) DEVICE — IRRIGATOR TOOMEY 70CC

## (undated) DEVICE — A2000 MULTI-USE SYRINGE KIT, P/N 701277-003KIT CONTENTS: 100ML CONTRAST RESERVOIR AND TUBING WITH CONTRAST SPIKE AND CLAMP: Brand: A2000 MULTI-USE SYRINGE KIT